# Patient Record
Sex: FEMALE | Race: WHITE | Employment: STUDENT | URBAN - METROPOLITAN AREA
[De-identification: names, ages, dates, MRNs, and addresses within clinical notes are randomized per-mention and may not be internally consistent; named-entity substitution may affect disease eponyms.]

---
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
## 2019-09-19 ENCOUNTER — TELEPHONE (OUTPATIENT)
Dept: PEDIATRICS | Facility: CLINIC | Age: 19
End: 2019-09-19

## 2019-09-19 NOTE — TELEPHONE ENCOUNTER
Called and spoke to Radha.  Discussed schedule a yearly follow up for weight loss surgery.  Radha did not think that she needed and appointment and reported that she was too busy.  Let Radha know that it was important that she have yearly check ups to check in and get blood work done.  Encouraged Radha to call back when she would like an appointment.  Radha had no other questions at this time.

## 2020-01-06 ENCOUNTER — OFFICE VISIT (OUTPATIENT)
Dept: GASTROENTEROLOGY | Facility: CLINIC | Age: 20
End: 2020-01-06
Payer: COMMERCIAL

## 2020-01-06 VITALS
DIASTOLIC BLOOD PRESSURE: 78 MMHG | SYSTOLIC BLOOD PRESSURE: 124 MMHG | OXYGEN SATURATION: 96 % | WEIGHT: 293 LBS | HEART RATE: 114 BPM | TEMPERATURE: 98.1 F | RESPIRATION RATE: 20 BRPM | BODY MASS INDEX: 49.78 KG/M2

## 2020-01-06 DIAGNOSIS — D50.9 IRON DEFICIENCY ANEMIA, UNSPECIFIED IRON DEFICIENCY ANEMIA TYPE: ICD-10-CM

## 2020-01-06 DIAGNOSIS — E66.01 SEVERE OBESITY (BMI >= 40) (H): Primary | ICD-10-CM

## 2020-01-06 LAB
ALBUMIN SERPL-MCNC: 3.2 G/DL (ref 3.4–5)
ALP SERPL-CCNC: 137 U/L (ref 40–150)
ALT SERPL W P-5'-P-CCNC: 20 U/L (ref 0–50)
ANION GAP SERPL CALCULATED.3IONS-SCNC: 4 MMOL/L (ref 3–14)
AST SERPL W P-5'-P-CCNC: 14 U/L (ref 0–45)
BILIRUB SERPL-MCNC: 0.3 MG/DL (ref 0.2–1.3)
BUN SERPL-MCNC: 9 MG/DL (ref 7–30)
CALCIUM SERPL-MCNC: 9.2 MG/DL (ref 8.5–10.1)
CHLORIDE SERPL-SCNC: 107 MMOL/L (ref 94–109)
CHOLEST SERPL-MCNC: 232 MG/DL
CO2 SERPL-SCNC: 28 MMOL/L (ref 20–32)
CREAT SERPL-MCNC: 0.67 MG/DL (ref 0.52–1.04)
ERYTHROCYTE [DISTWIDTH] IN BLOOD BY AUTOMATED COUNT: 15.6 % (ref 10–15)
FERRITIN SERPL-MCNC: 3 NG/ML (ref 12–150)
GFR SERPL CREATININE-BSD FRML MDRD: >90 ML/MIN/{1.73_M2}
GLUCOSE SERPL-MCNC: 97 MG/DL (ref 70–99)
HBA1C MFR BLD: 5.1 % (ref 0–5.6)
HCT VFR BLD AUTO: 33.7 % (ref 35–47)
HDLC SERPL-MCNC: 60 MG/DL
HGB BLD-MCNC: 10 G/DL (ref 11.7–15.7)
LDLC SERPL CALC-MCNC: 140 MG/DL
MCH RBC QN AUTO: 22.7 PG (ref 26.5–33)
MCHC RBC AUTO-ENTMCNC: 29.7 G/DL (ref 31.5–36.5)
MCV RBC AUTO: 76 FL (ref 78–100)
NONHDLC SERPL-MCNC: 172 MG/DL
PLATELET # BLD AUTO: 506 10E9/L (ref 150–450)
POTASSIUM SERPL-SCNC: 4 MMOL/L (ref 3.4–5.3)
PROT SERPL-MCNC: 7.3 G/DL (ref 6.8–8.8)
PTH-INTACT SERPL-MCNC: 69 PG/ML (ref 18–80)
RBC # BLD AUTO: 4.41 10E12/L (ref 3.8–5.2)
SODIUM SERPL-SCNC: 139 MMOL/L (ref 133–144)
TRIGL SERPL-MCNC: 159 MG/DL
VIT B12 SERPL-MCNC: 294 PG/ML (ref 193–986)
WBC # BLD AUTO: 9.8 10E9/L (ref 4–11)

## 2020-01-06 PROCEDURE — 82728 ASSAY OF FERRITIN: CPT | Performed by: PEDIATRICS

## 2020-01-06 PROCEDURE — 85027 COMPLETE CBC AUTOMATED: CPT | Performed by: PEDIATRICS

## 2020-01-06 PROCEDURE — 80053 COMPREHEN METABOLIC PANEL: CPT | Performed by: PEDIATRICS

## 2020-01-06 PROCEDURE — 83970 ASSAY OF PARATHORMONE: CPT | Performed by: PEDIATRICS

## 2020-01-06 PROCEDURE — 83036 HEMOGLOBIN GLYCOSYLATED A1C: CPT | Performed by: PEDIATRICS

## 2020-01-06 PROCEDURE — 82306 VITAMIN D 25 HYDROXY: CPT | Performed by: PEDIATRICS

## 2020-01-06 PROCEDURE — 82607 VITAMIN B-12: CPT | Performed by: PEDIATRICS

## 2020-01-06 PROCEDURE — 80061 LIPID PANEL: CPT | Performed by: PEDIATRICS

## 2020-01-06 PROCEDURE — 99214 OFFICE O/P EST MOD 30 MIN: CPT | Performed by: PEDIATRICS

## 2020-01-06 PROCEDURE — 36415 COLL VENOUS BLD VENIPUNCTURE: CPT | Performed by: PEDIATRICS

## 2020-01-06 RX ORDER — ETHYNODIOL DIACETATE AND ETHINYL ESTRADIOL 1 MG-35MCG
KIT ORAL
COMMUNITY
Start: 2019-10-02

## 2020-01-06 ASSESSMENT — PAIN SCALES - GENERAL: PAINLEVEL: NO PAIN (0)

## 2020-01-06 NOTE — Clinical Note
Juvenal Craig you call this kid to tell her about low hgb and lower ferritin (iron stores).  She should take vitron c bid.  rx is sent.  I will recheck her labs at next visit.  If levels don't improve substantially, may need iron infusion.  Thank you!c

## 2020-01-06 NOTE — PROGRESS NOTES
Date: 2020    PATIENT:  Bi Alves  :          2000  LYDIA:          2020    Dear Dr. Lesch, Anthony:    I had the pleasure of seeing your patient, Bi Alves, for a follow-up visit in the [facility] on 2020 at the [facility] in [place].  Bi was last seen in this clinic in 2017.  Please see below for my assessment and plan of care.    Intercurrent History:    Bi was accompanied to this appointment by herself.  As you may recall, Bi is a 19 year old young lady who is now 3 years s/p sleeve gastrectomy for severe complicated obesity (surgery was in Oct 2016).  Her last visit here was when she was 6 mos s/p surgery.  Her pre-surgery wt was 288 lbs and today she is 325 lbs.    She presents today primarily to get a signature on a form that will allow her insurance to pay for a gym membership.  Indeed she states that her primary goal is to increase her physical fitness.    She reports that since our last visit in , her weight got down to 218 pounds in 2018.  This was her bridgette.  However, in 2018 her dad had a serious stroke.  This incident led to weight regain with accompanying binge eating.    Her current eating practices are notable for having yogurt and apples for both breakfast and lunch.  For dinner she often eats a frozen meal (such as lean cuisine) or she prepares tacos or quiche for example.  She denies drinking liquid calories.  No alcohol use.  She denies current binge eating episodes.  She denies strong hunger.    Her review of systems is notable for good sleep without snoring.  Her periods are regular.  She denies dysphagia, abdominal pain, constipation.  She has diarrhea with certain foods, particularly those that are high in sugar.  She has GERD symptoms but these are well controlled with omeprazole.    She is currently living in an apartment with  "her dad.  Her dad completely recovered from the stroke.  Her mom is struggling with mental illness and has been in various residential treatment centers.  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  She is not working.    Radha has been meeting every 4 to 6 weeks with Vianey Rothman and mental health medication prescriber at [facility].  She reports that her mood has been up and down.       Current Medications:  Current Outpatient Rx   Medication Sig Dispense Refill     CRANBERRY EXTRACT PO Take 500 mg by mouth        ethynodiol-ethinyl estradiol (KELNOR 1/35) 1-35 MG-MCG tablet        HYDROXYZINE HCL PO Take 50 mg by mouth        omeprazole (PRILOSEC) 20 MG capsule   1     prazosin (MINIPRESS) 5 MG capsule Take 1 capsule (5 mg) by mouth At Bedtime       Venlafaxine HCl (EFFEXOR XR PO)        Levonorgestrel, [id number], (NORPLANT SYSTEM IL) 68 mg by IMPLANT route        melatonin 5 MG tablet Take 10 mg by mouth nightly as needed for sleep          Physical Exam:    Vitals:    B/P:   BP Readings from Last 1 Encounters:   01/06/20 124/78     BP:  Blood pressure percentiles are not available for patients who are 18 years or older.  P:   Pulse Readings from Last 1 Encounters:   01/06/20 114     R: @LASTBRATE(1)@    Measured Weights:  Wt Readings from Last 4 Encounters:   01/06/20 147.7 kg (325 lb 9.9 oz)   04/24/17 108 kg (238 lb) (>99 %)*   01/12/17 116.9 kg (257 lb 11.5 oz) (>99 %)*   11/10/16 122.1 kg (269 lb 2.9 oz) (>99 %)*     * Growth percentiles are based on CDC (Girls, 2-20 Years) data.     Height:    Ht Readings from Last 4 Encounters:   04/24/17 1.723 m (5' 7.82\") (92 %)*   01/12/17 1.74 m (5' 8.5\") (95 %)*   11/10/16 1.734 m (5' 8.27\") (95 %)*   10/19/16 1.74 m (5' 8.5\") (95 %)*     * Growth percentiles are based on CDC (Girls, 2-20 Years) data.       Body Mass Index:  Body mass index is 49.78 kg/m .  Body Mass Index Percentile:  Normalized BMI data available only for age 0 to 20 years.    Labs:    Results for " orders placed or performed in visit on 01/06/20   CBC with platelets [CMT216]     Status: Abnormal   Result Value Ref Range    WBC 9.8 4.0 - 11.0 10e9/L    RBC Count 4.41 3.8 - 5.2 10e12/L    Hemoglobin 10.0 (L) 11.7 - 15.7 g/dL    Hematocrit 33.7 (L) 35.0 - 47.0 %    MCV 76 (L) 78 - 100 fl    MCH 22.7 (L) 26.5 - 33.0 pg    MCHC 29.7 (L) 31.5 - 36.5 g/dL    RDW 15.6 (H) 10.0 - 15.0 %    Platelet Count 506 (H) 150 - 450 10e9/L   Comprehensive metabolic panel [LAB17]     Status: Abnormal   Result Value Ref Range    Sodium 139 133 - 144 mmol/L    Potassium 4.0 3.4 - 5.3 mmol/L    Chloride 107 94 - 109 mmol/L    Carbon Dioxide 28 20 - 32 mmol/L    Anion Gap 4 3 - 14 mmol/L    Glucose 97 70 - 99 mg/dL    Urea Nitrogen 9 7 - 30 mg/dL    Creatinine 0.67 0.52 - 1.04 mg/dL    GFR Estimate >90 >60 mL/min/[1.73_m2]    GFR Estimate If Black >90 >60 mL/min/[1.73_m2]    Calcium 9.2 8.5 - 10.1 mg/dL    Bilirubin Total 0.3 0.2 - 1.3 mg/dL    Albumin 3.2 (L) 3.4 - 5.0 g/dL    Protein Total 7.3 6.8 - 8.8 g/dL    Alkaline Phosphatase 137 40 - 150 U/L    ALT 20 0 - 50 U/L    AST 14 0 - 45 U/L   Parathyroid Hormone Intact [VMQ530]     Status: None   Result Value Ref Range    Parathyroid Hormone Intact 69 18 - 80 pg/mL   Vitamin D [AUD935]     Status: None   Result Value Ref Range    Vitamin D Deficiency screening 37 20 - 75 ug/L   Vitamin B12 [LAB67]     Status: None   Result Value Ref Range    Vitamin B12 294 193 - 986 pg/mL   Lipid Profile [LAB18]     Status: Abnormal   Result Value Ref Range    Cholesterol 232 (H) <200 mg/dL    Triglycerides 159 (H) <150 mg/dL    HDL Cholesterol 60 >49 mg/dL    LDL Cholesterol Calculated 140 (H) <100 mg/dL    Non HDL Cholesterol 172 (H) <130 mg/dL   Ferritin [LAB68]     Status: Abnormal   Result Value Ref Range    Ferritin 3 (L) 12 - 150 ng/mL   Hemoglobin A1c [LAB90]     Status: None   Result Value Ref Range    Hemoglobin A1C 5.1 0 - 5.6 %         Assessment:      Bi is a 19 year old female who  is 3 years status post laparoscopic sleeve gastrectomy for severe complicated obesity.  She reports achieving a bridgette weight of 218 pounds in June 2018.  However she has experienced significant weight re-gain since then, about 110 lbs and now weighs more than her presurgery weight and is at her highest weight ever.  She attributes this weight regain to family stressors.  She reports today that she is not interested in meeting with a dietitian or addressing her eating behaviors, nor is she interested in anti-obesity pharmacotherapy.  She is however motivated to increase her physical activity and wants me to sign a form that will allow her insurance to pay for a gym club membership.    She continues to meet with a mental health practitioner for both medication and therapy.  She also takes omeprazole for GERD and has good sx relief.   Her ROS is otherwise negative.  Her labs today are notable for iron deficiency anemia and dyslipidemia.  Her liver enzymes, vit D, B12, and diabetes screens are normal.  She is not taking any supplements.      I spent a total of 25 minutes face-to-face with Bi during today s office visit. Over 50% of this time was spent counseling the patient and/or coordinating care regarding obesity. See note for details.     Bi s current problem list reviewed today includes:    Encounter Diagnoses   Name Primary?     Severe obesity (BMI >= 40) (H) Yes     Iron deficiency anemia, unspecified iron deficiency anemia type         Care Plan:    Class 3 obesity:  Go to gym; form filled out for insurance.  Consider RD visit and anti obesity medication    Fe deficiency anemia:  Start  Vitron C 125/66 orally twice daily  Recheck hemoglobin and ferritin after two months of treatment    Mixed hyperlipidemia in context of FH of premature CVD and severe obesity:  Continue to monitor for now.    We are looking forward to seeing Bi for a follow-up visit in 12 weeks.    Thank you for including me in the  care of your patient.  Please do not hesitate to call with questions or concerns.    Sincerely,    Lenka Arce MD MPH  Diplomate, American Board of Obesity Medicine    Director, Pediatric Weight Management Clinic  Department of Pediatrics  [facility] [phone]  [facility] [phone]  [facility] [phone]  [facility] [place] [phone]            CC  Copy to patient  Perlita Sanchez KEVIN  [id number]  [id number]  [facility] [address]

## 2020-01-06 NOTE — NURSING NOTE
iB Alves's goals for this visit include:   Chief Complaint   Patient presents with     RECHECK     Follow up        She requests these members of her care team be copied on today's visit information: Yes    PCP: Lesch, Anthony    Referring Provider:  Anthony Lesch, PA-C  [facility]  [address] [place] [address]    /78 (BP Location: Left arm, Patient Position: Sitting, Cuff Size: Adult Large)   Pulse 114   Temp 98.1  F (36.7  C) (Oral)   Resp 20   Wt 67 kg (147 lb 11.2 oz)   SpO2 96%   BMI 22.58 kg/m      Do you need any medication refills at today's visit? Neyda De La Rosa CMA

## 2020-01-07 LAB — DEPRECATED CALCIDIOL+CALCIFEROL SERPL-MC: 37 UG/L (ref 20–75)

## 2020-01-13 ENCOUNTER — TELEPHONE (OUTPATIENT)
Dept: PEDIATRICS | Facility: CLINIC | Age: 20
End: 2020-01-13

## 2020-01-13 NOTE — TELEPHONE ENCOUNTER
Called and spoke to Radha.  Discussed lab results as indicated by Dr. Arce below:  Hi KAYA   Can you call this kid to tell her about low hgb and lower ferritin (iron stores).  She should take vitron c bid.  rx is sent.  I will recheck her labs at next visit.  If levels don't improve substantially, may need iron infusion.  Thank you!   c      Discussed importance of taking Vitron every day twice a day to help improve hgb and ferritin levels.  Discussed possible need of iron infusion if labs do not improve.  Lab will be rechecked at appointment in April.    Radah had no questions at this time.  Encouraged her to call back with any questions or concerns.

## 2020-03-02 ENCOUNTER — HEALTH MAINTENANCE LETTER (OUTPATIENT)
Age: 20
End: 2020-03-02

## 2020-04-05 NOTE — PROGRESS NOTES
"Bi Alves is a 20 year old female who is being evaluated via a billable video visit.      The patient has been notified of following:     \"This video visit will be conducted via a call between you and your physician/provider. We have found that certain health care needs can be provided without the need for an in-person physical exam.  This service lets us provide the care you need with a video conversation.  If a prescription is necessary we can send it directly to your pharmacy.  If lab work is needed we can place an order for that and you can then stop by our lab to have the test done at a later time.    If during the course of the call the physician/provider feels a video visit is not appropriate, you will not be charged for this service.\"     Patient has given verbal consent for Video visit? Yes    Patient would like the video invitation sent by: Text to cell phone: [phone]    Video Start Time: 3:300    Bi Alves complains of    Chief Complaint   Patient presents with     RECHECK     WM f/u      Radha doesn't have a scale at home. No recent weight.    I have reviewed and updated the patient's Past Medical History, Social History, Family History and Medication List.    ALLERGIES  Penicillins and Pineapple    Additional provider notes: see below      Video-Visit Details    Type of service:  Video Visit    Video End Time (time video stopped): 3:25    Originating Location (pt. Location): Home    Distant Location (provider location):  home    Mode of Communication:  Video Conference via [employer]      Lenka Arce MD, MD                Date: 2020    PATIENT:  Bi Alves  :          2000  LYDIA:          2020      Intercurrent History:    Bi is a 19 year old young lady who is now 3 years s/p sleeve gastrectomy for severe complicated obesity (surgery was in Oct 2016).  She was last seen in this clinic 3 months ago.  That visit was after a very long hiatus from " the weight management program.  3 months ago she expressed no interest in addressing her dietary patterns.  Today is the same.  She reports and generalizations that she is eating a lot of salads.  She denies binge eating.  She denies eating to cope with stress or anxiety.  She states that she eats in her room at times only because there is no dining room area.  She eats dinner on the couch.  She denies eating secretively.  Her physical activity is limited.  Although she had plans to join a gym, this has not happened because of the COVID pandemic.    Radha reports that her mood has been somewhat low.  However, she excitedly reported that she went on her first date.  She met someone on an online platform.  Since that date, they have been speaking daily however they have not been able to see each other because of the quarantine.    She no longer has a psychiatrist or therapist.  She knows she needs to get 1 in order to get her psychotropic medications refilled.  She denies having any suicidal ideation.    She continues to live with her parents.  Her mom has been home since December but spends most of her days in bed, struggling with depression.  Tania is attending online school.    She has been taking her iron supplement only once per day and only since Feb.  Energy is low.    Current Medications:  Current Outpatient Rx   Medication Sig Dispense Refill     CRANBERRY EXTRACT PO Take 500 mg by mouth        ethynodiol-ethinyl estradiol (KELNOR 1/35) 1-35 MG-MCG tablet        ferrous fumarate 65 mg, Tribe. FE,-Vitamin C 125 mg (VITRON-C)  MG TABS tablet Take 1 tablet by mouth 2 times daily 120 tablet 3     HYDROXYZINE HCL PO Take 50 mg by mouth        lamoTRIgine (LAMICTAL) 200 MG tablet Take 1 tablet (200 mg) by mouth daily       omeprazole (PRILOSEC) 20 MG capsule   1     prazosin (MINIPRESS) 5 MG capsule Take 1 capsule (5 mg) by mouth At Bedtime       Venlafaxine HCl (EFFEXOR XR PO) Take 200 mg by mouth daily     "    melatonin 5 MG tablet Take 10 mg by mouth nightly as needed for sleep          Physical Exam:    Vitals:    B/P:   BP Readings from Last 1 Encounters:   01/06/20 124/78     BP:  Blood pressure percentiles are not available for patients who are 18 years or older.  P:   Pulse Readings from Last 1 Encounters:   01/06/20 114     R: @LASTBRATE(1)@    Measured Weights: no home wt today bc no scale that works  Wt Readings from Last 4 Encounters:   01/06/20 147.7 kg (325 lb 9.9 oz)   04/24/17 108 kg (238 lb) (>99 %)*   01/12/17 116.9 kg (257 lb 11.5 oz) (>99 %)*   11/10/16 122.1 kg (269 lb 2.9 oz) (>99 %)*     * Growth percentiles are based on CDC (Girls, 2-20 Years) data.     Height:    Ht Readings from Last 4 Encounters:   04/24/17 1.723 m (5' 7.82\") (92 %)*   01/12/17 1.74 m (5' 8.5\") (95 %)*   11/10/16 1.734 m (5' 8.27\") (95 %)*   10/19/16 1.74 m (5' 8.5\") (95 %)*     * Growth percentiles are based on [facility] (Girls, 2-20 Years) data.       Body Mass Index:  There is no height or weight on file to calculate BMI.  Body Mass Index Percentile:  No height and weight on file for this encounter.    Labs:    No results found for any visits on 04/06/20.      Assessment:      Bi is a 19 year old female who is 3 years status post laparoscopic sleeve gastrectomy for severe complicated obesity.  She has regained all of the weight (approximately 100 pounds) that she lost since bariatric surgery.  She reports no significant changes since our last visit 3 months ago and continues to not be interested in pursuing specific weight management therapies such as dietary interventions or anti-obesity medications.  Although she is motivated to increase her physical activity, her plans of attending a gym have been derailed due to the Covid pandemic.  Her mood has been fair on her current psychotropic medication regimen though she is in need of a new psychiatrist because her current one moved on.  Finally, she has not been compliant with " her iron supplementation.    I spent a total of 25 minutes face-to-face with Bi during today s office visit. Over 50% of this time was spent counseling the patient and/or coordinating care regarding obesity. See note for details.     Bi s current problem list reviewed today includes:    Encounter Diagnosis   Name Primary?     Depression, unspecified depression type Yes        Care Plan:    Class 3 obesity:  Patient agreed to walking outside.  No specific goal was made.  Instructed to obtain scale for home use before our next visit.  Not interested in anti obesity medication or RD visit.    Fe deficiency anemia:  Patient agreed to taking Vitron C 125/66 orally twice daily  Recheck hemoglobin and ferritin after two months of treatment    Mixed hyperlipidemia in context of FH of premature CVD and severe obesity:  Continue to monitor for now.    Depression and anxiety:  Find new psychiatrist.    We are looking forward to seeing Bi for a follow-up visit in 12 weeks.    Thank you for including me in the care of your patient.  Please do not hesitate to call with questions or concerns.    Sincerely,    Lenka Arce MD MPH  Diplomate, American Board of Obesity Medicine    Director, Pediatric Weight Management Clinic  Department of Pediatrics  [facility] [phone]  [facility] [phone]  [facility] [phone]  [facility] [place] [phone]            CC  Copy to patient  Perlita SanchezCYNTHIAOTTO  [address]  [id number]  [facility] [address]

## 2020-04-06 ENCOUNTER — VIRTUAL VISIT (OUTPATIENT)
Dept: GASTROENTEROLOGY | Facility: CLINIC | Age: 20
End: 2020-04-06
Payer: COMMERCIAL

## 2020-04-06 DIAGNOSIS — F32.A DEPRESSION, UNSPECIFIED DEPRESSION TYPE: ICD-10-CM

## 2020-04-06 DIAGNOSIS — E66.01 SEVERE OBESITY (BMI >= 40) (H): ICD-10-CM

## 2020-04-06 DIAGNOSIS — D50.9 IRON DEFICIENCY ANEMIA, UNSPECIFIED IRON DEFICIENCY ANEMIA TYPE: ICD-10-CM

## 2020-04-06 PROCEDURE — 99214 OFFICE O/P EST MOD 30 MIN: CPT | Mod: GT | Performed by: PEDIATRICS

## 2020-04-06 RX ORDER — LAMOTRIGINE 200 MG/1
200 TABLET ORAL DAILY
COMMUNITY
Start: 2020-04-06

## 2020-04-08 ENCOUNTER — TELEPHONE (OUTPATIENT)
Dept: GASTROENTEROLOGY | Facility: CLINIC | Age: 20
End: 2020-04-08

## 2020-04-08 NOTE — TELEPHONE ENCOUNTER
1st  Attempt: M for Radha to call back to schedule her routine follow up appointment with Dr Arce.  Radha is due to be seen in July.  I asked Radha to call [phone] to schedule this appointment.       Macho Clemons  Procedure , [place]  [facility]

## 2020-04-22 NOTE — TELEPHONE ENCOUNTER
2nd Attempt: [facility] for Radha to call back to schedule her routine follow up appointment with Dr Arce.  Radha is due to be seen in July.  I asked Radha to call [phone] to schedule this appointment.      3rd Attempt Letter sent.     Macho Clemons  Procedure , [facility] Adult Endocrinology

## 2020-12-14 ENCOUNTER — HEALTH MAINTENANCE LETTER (OUTPATIENT)
Age: 20
End: 2020-12-14

## 2021-04-18 ENCOUNTER — HEALTH MAINTENANCE LETTER (OUTPATIENT)
Age: 21
End: 2021-04-18

## 2021-04-29 RX ORDER — NORETHINDRONE ACETATE AND ETHINYL ESTRADIOL 1; 5 MG/1; UG/1
1 TABLET ORAL DAILY
COMMUNITY
End: 2021-05-14

## 2021-04-29 RX ORDER — OMEPRAZOLE 20 MG/1
20 CAPSULE, DELAYED RELEASE ORAL DAILY
COMMUNITY

## 2021-04-29 RX ORDER — CLONIDINE HYDROCHLORIDE 0.2 MG/1
0.4 TABLET ORAL DAILY
COMMUNITY

## 2021-04-29 RX ORDER — VENLAFAXINE 37.5 MG/1
37.5 TABLET ORAL DAILY
COMMUNITY
End: 2021-05-14

## 2021-04-29 RX ORDER — HYDROXYZINE 50 MG/1
50 TABLET, FILM COATED ORAL DAILY
COMMUNITY

## 2021-04-29 RX ORDER — LAMOTRIGINE 200 MG/1
200 TABLET ORAL DAILY
COMMUNITY

## 2021-05-14 ENCOUNTER — OFFICE VISIT (OUTPATIENT)
Dept: BARIATRICS/WEIGHT MGMT | Facility: CLINIC | Age: 21
End: 2021-05-14

## 2021-05-14 VITALS
DIASTOLIC BLOOD PRESSURE: 70 MMHG | HEIGHT: 69 IN | TEMPERATURE: 97.1 F | BODY MASS INDEX: 43.4 KG/M2 | WEIGHT: 293 LBS | RESPIRATION RATE: 18 BRPM | OXYGEN SATURATION: 98 % | SYSTOLIC BLOOD PRESSURE: 124 MMHG | HEART RATE: 124 BPM

## 2021-05-14 DIAGNOSIS — E66.01 OBESITY, CLASS III, BMI 40-49.9 (MORBID OBESITY) (HCC): Primary | ICD-10-CM

## 2021-05-14 DIAGNOSIS — R10.13 DYSPEPSIA: ICD-10-CM

## 2021-05-14 DIAGNOSIS — K21.9 GASTROESOPHAGEAL REFLUX DISEASE, UNSPECIFIED WHETHER ESOPHAGITIS PRESENT: ICD-10-CM

## 2021-05-14 PROCEDURE — 99204 OFFICE O/P NEW MOD 45 MIN: CPT | Performed by: PHYSICIAN ASSISTANT

## 2021-05-14 RX ORDER — VENLAFAXINE HYDROCHLORIDE 75 MG/1
CAPSULE, EXTENDED RELEASE ORAL
COMMUNITY

## 2021-05-14 RX ORDER — QUETIAPINE FUMARATE 25 MG/1
25 TABLET, FILM COATED ORAL DAILY
COMMUNITY
Start: 2021-05-05 | End: 2021-05-14

## 2021-05-14 RX ORDER — ETHYNODIOL DIACETATE AND ETHINYL ESTRADIOL 1 MG-35MCG
1 KIT ORAL DAILY
COMMUNITY
Start: 2021-04-12

## 2021-05-14 RX ORDER — QUETIAPINE FUMARATE 25 MG/1
TABLET, FILM COATED ORAL
COMMUNITY

## 2021-05-14 RX ORDER — VENLAFAXINE HYDROCHLORIDE 150 MG/1
CAPSULE, EXTENDED RELEASE ORAL
COMMUNITY

## 2021-05-14 NOTE — PROGRESS NOTES
"[facility] BARIATRIC SURGERY  [address]  [address]  [facility] [address] [phone]      Patient  Name:  Nas Baird  :  2000      Date of Visit: 2021      Chief Complaint:  weight gain; unable to maintain weight loss      History of Present Illness:  Nas Baird is a 21 y.o. female who presents today for evaluation, education and consultation regarding revision metabolic and bariatric surgery with Dr. Resendiz.    s/p LSG/HHR 10/2016 w/ Dr. Petty in [place] ([address]).     Highest lifetime weight:  340 lbs.  Presurgery weight: 282 lbs w/ BMI 42.  Lowest weight: 215 lbs.  Current weight: 333 lbs.      Hx GERD, unchanged s/p LSG/HHR, controlled w/ daily PPI.  Denies dysphagia/N/V/AP.   Reports hx IBS - chronic loose stools + cramping, ? food sensitivity.  Denies prior GB eval.  No recent UGI imaging or endoscopic evals.  No recent bariatric care.      Feels like she still has portion control.  Eats \"healthy\" - salads, yogurt, eggs.  Drinks water.  Exercise varies, but sometimes limited by her depression.      Complete history has been obtained and discussed today, as pertinent to metabolic/ bariatric surgery.     Addendum:  UGI 21 @[facility] - unremarkable, s/p VSG.     Past Medical History:   Diagnosis Date   • Anxiety and depression    • Dyspepsia    • Dyspnea on exertion    • Fatigue    • GERD (gastroesophageal reflux disease)     controlled on Omeprazole 20mg daily (x5 yrs)   • Hypertriglyceridemia    • IBS (irritable bowel syndrome)     chronic loose stools + cramping, ? food sensitivity   • Insomnia    • Morbid obesity (CMS/HCC)    • Postoperative nausea    • Sleep apnea     patient denies, although [place] records report h/o     Past Surgical History:   Procedure Laterality Date   • GASTRIC SLEEVE LAPAROSCOPIC      s/p LSG/HHR 10/2016 w/ Dr. Petty in [place] ([address])   • TONSILLECTOMY     • WISDOM TOOTH EXTRACTION         Allergies   Allergen Reactions   • " Adhesive Tape Rash   • Penicillins Hives     Uncertain if can tolerate Keflex.         Current Outpatient Medications:   •  cloNIDine (CATAPRES) 0.2 MG tablet, Take 0.4 mg by mouth Daily., Disp: , Rfl:   •  ethynodiol-ethinyl estradiol (KELNOR,ZOVIA) 1-35 MG-MCG per tablet, Take 1 tablet by mouth Daily., Disp: , Rfl:   •  hydrOXYzine (ATARAX) 50 MG tablet, Take 50 mg by mouth Daily., Disp: , Rfl:   •  lamoTRIgine (LaMICtal) 200 MG tablet, Take 200 mg by mouth Daily., Disp: , Rfl:   •  omeprazole (priLOSEC) 20 MG capsule, Take 20 mg by mouth Daily., Disp: , Rfl:   •  QUEtiapine (SEROquel) 25 MG tablet, , Disp: , Rfl:   •  venlafaxine XR (Effexor XR) 150 MG 24 hr capsule, , Disp: , Rfl:   •  venlafaxine XR (Effexor XR) 75 MG 24 hr capsule, , Disp: , Rfl:     Social History     Socioeconomic History   • Marital status: Single     Spouse name: Not on file   • Number of children: Not on file   • Years of education: Not on file   • Highest education level: Not on file   Tobacco Use   • Smoking status: Never Smoker   • Smokeless tobacco: Never Used   Substance and Sexual Activity   • Alcohol use: Never   • Drug use: Never     Social History     Social History Narrative    Lives in [place], [place] w/ her parents.  [employer] - not yet started working.         Family History   Problem Relation Age of Onset   • Diabetes Mother    • Stroke Father    • No Known Problems Brother    • Skin cancer Maternal Grandmother    • Diabetes Maternal Grandmother    • Diabetes Maternal Grandfather    • No Known Problems Paternal Grandmother    • Hypertension Paternal Grandfather    • Heart disease Paternal Grandfather    • Heart attack Paternal Grandfather    • Sleep apnea Paternal Grandfather        Review of Systems:  Constitutional:  reports fatigue, weight gain and denies fevers, chills.  HEENT:  denies headache, ear pain or loss of hearing, blurred or double vision, nasal discharge or sore throat.  Cardiovascular:   denies HTN, hx heart disease,  chest pain, palpitations, hx DVT.  Respiratory:   denies cough , wheezing, sleep apnea, asthma, hx PE.  Gastrointestinal:  reports heartburn, IBS and denies dysphagia, nausea, vomiting, abdominal pain, gallbladder issues, liver disease.  Genitourinary:   denies incontinence, hematuria, dysuria, polyuria, polydipsia, renal insufficiency.    Musculoskeletal:   denies fibromyalgia, arthritis and autoimmune disease.  Neurological:   denies numbness /tingling, dizziness, confusion, seizure.  Psychiatric:  reports hx depression, hx anxiety and denies bipolar disorder.  Endocrine:  denies glucose intolerance, diabetes, thyroid disease.  Hematologic:  denies bruising, bleeding disorder, hx anemia, hx blood transfusion.  Skin:   denies rashes, hx MRSA.    Physical Exam:  Vital Signs:  Weight: (!) 151 kg (333 lb 8 oz)   Body mass index is 49.97 kg/m².  Temp: 97.1 °F (36.2 °C)   Heart Rate: (!) 124   BP: 124/70     Physical Exam  Vitals reviewed.   Constitutional:       Appearance: She is well-developed.      Comments: wearing a mask   HENT:      Head: Normocephalic and atraumatic.   Eyes:      General: No scleral icterus.     Conjunctiva/sclera: Conjunctivae normal.   Neck:      Thyroid: No thyromegaly.   Cardiovascular:      Rate and Rhythm: Normal rate and regular rhythm.      Heart sounds: No murmur heard.     Pulmonary:      Effort: Pulmonary effort is normal. No respiratory distress.      Breath sounds: Normal breath sounds. No wheezing or rales.   Abdominal:      General: Bowel sounds are normal. There is no distension.      Palpations: Abdomen is soft. There is no mass.      Tenderness: There is no abdominal tenderness.      Hernia: No hernia is present.      Comments: scars: LSG   Musculoskeletal:         General: Normal range of motion.      Cervical back: Neck supple.   Skin:     General: Skin is warm and dry.      Findings: No rash.   Neurological:      Mental Status: She is alert and oriented to person, place, and  time.      Gait: Gait normal.   Psychiatric:         Judgment: Judgment normal.         Patient Active Problem List   Diagnosis   • Anxiety and depression   • Morbid obesity (CMS/HCC)   • GERD (gastroesophageal reflux disease)   • Sleep apnea   • Hypertriglyceridemia   • Insomnia   • PONV (postoperative nausea and vomiting)   • Fatigue   • Dyspepsia   • Dyspnea on exertion   • IBS (irritable bowel syndrome)       Assessment:  21 y.o. female with medically complicated obesity pursuing Revision.    Metabolic & Bariatric Surgery is deemed medically necessary given the following: Patient's Body mass index is 49.97 kg/m². indicating that she is obese (BMI >30). Obesity-related health conditions include the following: GERD and anxiety/depression. Obesity is worsening. BMI is is above average; BMI management plan is completed. We discussed consulting a Bariatric surgeon and pursuing revision eval.       Plan:  Current revision options unknown.  Will schedule UGI + EGD w/ Dr. Resendiz for further evaluation.      If deemed a surgical candidate, additional eval will include: CBC, CMP, Lipids, TSH, HgA1C, H.Pylori, EKG, CXR and Gallbladder Eval.    Patient understands that bariatric surgery is not cosmetic surgery but rather a tool to help make a lifelong commitment to lifestyle changes including diet, exercise and behavior modifications.  The patient has been educated today on those expected postoperative lifestyle changes.  Psychological and Nutritional consultations will be completed prior to surgery.  All questions/concerns have been addressed.      Further input to follow pending the above.      Addendum:  UGI 6/2/21 @[facility] - unremarkable, s/p VSG.   Will proceed w/ EGD for further eval.          REMI Johns

## 2021-05-30 ENCOUNTER — APPOINTMENT (OUTPATIENT)
Dept: PREADMISSION TESTING | Facility: HOSPITAL | Age: 21
End: 2021-05-30

## 2021-05-30 PROCEDURE — U0004 COV-19 TEST NON-CDC HGH THRU: HCPCS

## 2021-05-30 PROCEDURE — C9803 HOPD COVID-19 SPEC COLLECT: HCPCS

## 2021-05-31 LAB — SARS-COV-2 RNA NOSE QL NAA+PROBE: NOT DETECTED

## 2021-06-02 ENCOUNTER — HOSPITAL ENCOUNTER (OUTPATIENT)
Dept: GENERAL RADIOLOGY | Facility: HOSPITAL | Age: 21
Discharge: HOME | End: 2021-06-02
Admitting: PHYSICIAN ASSISTANT

## 2021-06-02 DIAGNOSIS — K21.9 GASTROESOPHAGEAL REFLUX DISEASE, UNSPECIFIED WHETHER ESOPHAGITIS PRESENT: ICD-10-CM

## 2021-06-02 DIAGNOSIS — R10.13 DYSPEPSIA: ICD-10-CM

## 2021-06-02 PROCEDURE — 74240 X-RAY XM UPR GI TRC 1CNTRST: CPT

## 2021-06-02 RX ADMIN — BARIUM SULFATE 183 ML: 960 POWDER, FOR SUSPENSION ORAL at 10:14

## 2021-06-28 ENCOUNTER — OFFICE VISIT (OUTPATIENT)
Dept: BARIATRICS/WEIGHT MGMT | Facility: CLINIC | Age: 21
End: 2021-06-28

## 2021-06-28 VITALS
OXYGEN SATURATION: 98 % | RESPIRATION RATE: 18 BRPM | HEIGHT: 69 IN | SYSTOLIC BLOOD PRESSURE: 126 MMHG | HEART RATE: 109 BPM | WEIGHT: 293 LBS | BODY MASS INDEX: 43.4 KG/M2 | TEMPERATURE: 97.3 F | DIASTOLIC BLOOD PRESSURE: 72 MMHG

## 2021-06-28 DIAGNOSIS — K21.9 GASTROESOPHAGEAL REFLUX DISEASE, UNSPECIFIED WHETHER ESOPHAGITIS PRESENT: ICD-10-CM

## 2021-06-28 DIAGNOSIS — R10.9 ABDOMINAL PAIN, UNSPECIFIED ABDOMINAL LOCATION: ICD-10-CM

## 2021-06-28 DIAGNOSIS — Z98.84 STATUS POST BARIATRIC SURGERY: Primary | ICD-10-CM

## 2021-06-28 PROCEDURE — 99214 OFFICE O/P EST MOD 30 MIN: CPT | Performed by: PHYSICIAN ASSISTANT

## 2021-06-28 NOTE — PROGRESS NOTES
"[facility] Bariatric Surgery  [address]  [address]  [facility] [address] [phone]        Patient Name:  Nas Baird.  :  2000        Reason for Visit:   Weight gain, unable to maintain weightloss, evaluate for possible metabolic and bariatric surgery      HPI: Nas Baird is a 21 y.o. female s/p LSG/HHR 10/2016 w/ Dr. Petty in [place] ([address]) who presents for evaluation of reflux in preparation for bariatric and metabolic surgery, specifically revision with Dr. Resendiz.     Initial intake with Berna Marte PA-C 21- \"Highest lifetime weight:  340 lbs.  Presurgery weight: 282 lbs w/ BMI 42.  Lowest weight: 215 lbs.  Current weight: 333 lbs.                  Hx GERD, unchanged s/p LSG/HHR, controlled w/ daily PPI.  Denies dysphagia/N/V/AP.   Reports hx IBS - chronic loose stools + cramping, ? food sensitivity.  Denies prior GB eval.  No recent UGI imaging or endoscopic evals.  No recent bariatric care.       Feels like she still has portion control.  Eats \"healthy\" - salads, yogurt, eggs.  Drinks water.  Exercise varies, but sometimes limited by her depression. \"     Recent imaging:    UGI at [facility] 21 IMPRESSION:  Status post vertical sleeve gastrectomy x5 years. There was  no evidence of extraluminal contrast. No postoperative strictures were  seen.          Doing well.  No changes in medical history since last office visit. Continues with chronic \"digestive issues\", urgent BM post prandial, some cramping. Reflux is controlled on daily PPI. No other issues/concerns today. Denies dysphagia, reflux, nausea, vomiting, abdominal pain, pulmonary issues and fevers.  Denies prior history of h pylori. Had an EGD in middle school, non since. Again, GB present without prior eval, planned GBUS is proceeding with revision surgery.         Past Medical History:   Diagnosis Date   • Anxiety and depression    • Dyspepsia    • Dyspnea on exertion    • Fatigue    • GERD " "(gastroesophageal reflux disease)     controlled on Omeprazole 20mg daily (x5 yrs)   • Hypertriglyceridemia    • IBS (irritable bowel syndrome)     chronic loose stools + cramping, ? food sensitivity   • Insomnia    • Morbid obesity (CMS/HCC)    • Postoperative nausea    • Sleep apnea     patient denies, although [place] records report h/o     Past Surgical History:   Procedure Laterality Date   • GASTRIC SLEEVE LAPAROSCOPIC  2016    s/p LSG/HHR 10/2016 w/ Dr. Petty in [place] ([address])   • TONSILLECTOMY  2003   • WISDOM TOOTH EXTRACTION  2017     Outpatient Medications Marked as Taking for the 6/28/21 encounter (Office Visit) with Evelyn Franco PA-C   Medication Sig Dispense Refill   • cloNIDine (CATAPRES) 0.2 MG tablet Take 0.4 mg by mouth Daily.     • ethynodiol-ethinyl estradiol (KELNOR,ZOVIA) 1-35 MG-MCG per tablet Take 1 tablet by mouth Daily.     • hydrOXYzine (ATARAX) 50 MG tablet Take 50 mg by mouth Daily.     • lamoTRIgine (LaMICtal) 200 MG tablet Take 200 mg by mouth Daily.     • omeprazole (priLOSEC) 20 MG capsule Take 20 mg by mouth Daily.     • QUEtiapine (SEROquel) 25 MG tablet      • venlafaxine XR (Effexor XR) 150 MG 24 hr capsule      • venlafaxine XR (Effexor XR) 75 MG 24 hr capsule          Allergies   Allergen Reactions   • Adhesive Tape Rash   • Penicillins Hives     Uncertain if can tolerate Keflex.         Social History     Socioeconomic History   • Marital status: Single     Spouse name: Not on file   • Number of children: Not on file   • Years of education: Not on file   • Highest education level: Not on file   Tobacco Use   • Smoking status: Never Smoker   • Smokeless tobacco: Never Used   Substance and Sexual Activity   • Alcohol use: Never   • Drug use: Never       /72 (BP Location: Left arm, Patient Position: Sitting, Cuff Size: Adult)   Pulse 109   Temp 97.3 °F (36.3 °C) (Temporal)   Resp 18   Ht 174 cm (68.5\")   Wt (!) 149 kg (329 lb)   SpO2 98%   BMI 49.30 kg/m² "     Physical Exam  Constitutional:       Appearance: She is well-developed. She is obese.   HENT:      Head: Normocephalic and atraumatic.      Comments: mask  Cardiovascular:      Rate and Rhythm: Tachycardia present.   Pulmonary:      Effort: Pulmonary effort is normal.   Abdominal:      General: There is no distension.      Palpations: Abdomen is soft. There is no mass.      Tenderness: There is no abdominal tenderness.      Comments: Lap scars   Neurological:      Mental Status: She is alert.   Psychiatric:         Mood and Affect: Mood normal.         Behavior: Behavior normal.         Thought Content: Thought content normal.         Judgment: Judgment normal.           Assessment:  s/p LSG/HHR 10/2016 w/ Dr. Petty in [place] (34 Fr [facility])     ICD-10-CM ICD-9-CM   1. Status post bariatric surgery  Z98.84 V45.86   2. Gastroesophageal reflux disease, unspecified whether esophagitis present  K21.9 530.81   3. Abdominal pain, unspecified abdominal location  R10.9 789.00         Plan:  Will proceed with EGD for further evaluation. The risks and benefits of the upper endoscopy were discussed with the patient in detail and all questions were answered.  Possibility of perforation, bleeding, aspiration, and anesthesia reaction were reviewed.  Patient agrees to proceed.    Patient's Body mass index is 49.3 kg/m². indicating that she is morbidly obese (BMI > 40 or > 35 with obesity - related health condition). Obesity-related health conditions include the following: as above. Obesity is worsening. BMI is is above average; BMI management plan is completed. We discussed consulting a Bariatric surgeon..

## 2021-07-16 ENCOUNTER — APPOINTMENT (OUTPATIENT)
Dept: PREADMISSION TESTING | Facility: HOSPITAL | Age: 21
End: 2021-07-16

## 2021-07-16 LAB — SARS-COV-2 RNA PNL SPEC NAA+PROBE: NOT DETECTED

## 2021-07-16 PROCEDURE — U0004 COV-19 TEST NON-CDC HGH THRU: HCPCS

## 2021-07-16 PROCEDURE — C9803 HOPD COVID-19 SPEC COLLECT: HCPCS

## 2021-08-09 ENCOUNTER — TELEMEDICINE (OUTPATIENT)
Dept: BARIATRICS/WEIGHT MGMT | Facility: CLINIC | Age: 21
End: 2021-08-09

## 2021-08-09 DIAGNOSIS — K21.9 GASTROESOPHAGEAL REFLUX DISEASE, UNSPECIFIED WHETHER ESOPHAGITIS PRESENT: Primary | ICD-10-CM

## 2021-08-09 PROCEDURE — 99214 OFFICE O/P EST MOD 30 MIN: CPT | Performed by: PHYSICIAN ASSISTANT

## 2021-08-09 NOTE — PROGRESS NOTES
"[facility] Bariatric Surgery  [address]  [address]  [facility] [id number] [phone]        Patient Name:  Nas Baird.  :  2000        Reason for Visit:   Weight gain, unable to maintain weightloss, evaluate for possible metabolic and bariatric surgery      HPI: Nas Baird is a 21 y.o. female s/p LSG/HHR 10/2016 w/ Dr. Petty in [place] ([address]) who presents for evaluation of reflux in preparation for bariatric and metabolic surgery, specifically revision with Dr. Resendiz.     This was an audio and video enabled telemedicine encounter due to covid-19 pandemic, patient consents.       Initial intake with Berna Marte PA-C 21- \"Highest lifetime weight:  340 lbs.  Presurgery weight: 282 lbs w/ BMI 42.  Lowest weight: 215 lbs.  Current weight: 333 lbs.                  Hx GERD, unchanged s/p LSG/HHR, controlled w/ daily PPI.  Denies dysphagia/N/V/AP.   Reports hx IBS - chronic loose stools + cramping, ? food sensitivity.  Denies prior GB eval.  No recent UGI imaging or endoscopic evals.  No recent bariatric care.       Feels like she still has portion control.  Eats \"healthy\" - salads, yogurt, eggs.  Drinks water.  Exercise varies, but sometimes limited by her depression. \"      Recent imaging:     UGI at [facility] 21 IMPRESSION:  Status post vertical sleeve gastrectomy x5 years. There was  no evidence of extraluminal contrast. No postoperative strictures were  seen.        Today, doing well.  No changes in medical history since last office visit. Continues with chronic \"digestive issues\",urgen BM post prandial, some carmping. Reflux continues to be controlled on daily omeprazole 20mg. No other issues/concerns today. Denies dysphagia, reflux, nausea, vomiting, abdominal pain, pulmonary issues and fevers. Denies prior testing of h pylro. Had remote EGD in middle school, non since. Again, GB present with planned GBUS if proceeding with revision surgery.       Past Medical " History:   Diagnosis Date   • Anxiety and depression    • Dyspepsia    • Dyspnea on exertion    • Fatigue    • GERD (gastroesophageal reflux disease)     controlled on Omeprazole 20mg daily (x5 yrs)   • Hypertriglyceridemia    • IBS (irritable bowel syndrome)     chronic loose stools + cramping, ? food sensitivity   • Insomnia    • Morbid obesity (CMS/HCC)    • Postoperative nausea    • Sleep apnea     patient denies, although [place] records report h/o     Past Surgical History:   Procedure Laterality Date   • GASTRIC SLEEVE LAPAROSCOPIC  2016    s/p LSG/HHR 10/2016 w/ Dr. Petty in [place] ([address])   • TONSILLECTOMY  2003   • WISDOM TOOTH EXTRACTION  2017     Outpatient Medications Marked as Taking for the 8/9/21 encounter (Telemedicine) with Evelyn Franco PA-C   Medication Sig Dispense Refill   • cloNIDine (CATAPRES) 0.2 MG tablet Take 0.4 mg by mouth Daily.     • ethynodiol-ethinyl estradiol (KELNOR,ZOVIA) 1-35 MG-MCG per tablet Take 1 tablet by mouth Daily.     • hydrOXYzine (ATARAX) 50 MG tablet Take 50 mg by mouth Daily.     • lamoTRIgine (LaMICtal) 200 MG tablet Take 200 mg by mouth Daily.     • omeprazole (priLOSEC) 20 MG capsule Take 20 mg by mouth Daily.     • QUEtiapine (SEROquel) 25 MG tablet      • venlafaxine XR (Effexor XR) 150 MG 24 hr capsule      • venlafaxine XR (Effexor XR) 75 MG 24 hr capsule          Allergies   Allergen Reactions   • Adhesive Tape Rash   • Penicillins Hives     Uncertain if can tolerate Keflex.         Social History     Socioeconomic History   • Marital status: Single     Spouse name: Not on file   • Number of children: Not on file   • Years of education: Not on file   • Highest education level: Not on file   Tobacco Use   • Smoking status: Never Smoker   • Smokeless tobacco: Never Used   Substance and Sexual Activity   • Alcohol use: Never   • Drug use: Never       There were no vitals taken for this visit.    Physical Exam  Constitutional:       Appearance: She is  well-developed.   HENT:      Head: Normocephalic and atraumatic.   Pulmonary:      Effort: Pulmonary effort is normal.   Neurological:      Mental Status: She is alert and oriented to person, place, and time.   Psychiatric:         Thought Content: Thought content normal.       Last BMI 49    Assessment:  s/p LSG/HHR 10/2016 w/ Dr. Petty in [place] ([address]) who presents for evaluation of reflux in preparation for bariatric and metabolic surgery, specifically revision with Dr. Resendiz.     ICD-10-CM ICD-9-CM   1. Gastroesophageal reflux disease, unspecified whether esophagitis present  K21.9 530.81         Plan:  Will proceed with EGD for further evaluation. The risks and benefits of the upper endoscopy were discussed with the patient in detail and all questions were answered.  Possibility of perforation, bleeding, aspiration, and anesthesia reaction were reviewed.  Patient agrees to proceed.    Patient's There is no height or weight on file to calculate BMI. indicating that she is morbidly obese (BMI > 40 or > 35 with obesity - related health condition). Obesity-related health conditions include the following: as above. Obesity is worsening. BMI is is above average; BMI management plan is completed. We discussed consulting a Bariatric surgeon..

## 2021-08-13 ENCOUNTER — APPOINTMENT (OUTPATIENT)
Dept: PREADMISSION TESTING | Facility: HOSPITAL | Age: 21
End: 2021-08-13

## 2021-08-13 LAB — SARS-COV-2 RNA PNL SPEC NAA+PROBE: NOT DETECTED

## 2021-08-13 PROCEDURE — C9803 HOPD COVID-19 SPEC COLLECT: HCPCS

## 2021-08-13 PROCEDURE — U0004 COV-19 TEST NON-CDC HGH THRU: HCPCS

## 2021-08-16 ENCOUNTER — LAB REQUISITION (OUTPATIENT)
Dept: LAB | Facility: HOSPITAL | Age: 21
End: 2021-08-16

## 2021-08-16 ENCOUNTER — OUTSIDE FACILITY SERVICE (OUTPATIENT)
Dept: BARIATRICS/WEIGHT MGMT | Facility: CLINIC | Age: 21
End: 2021-08-16

## 2021-08-16 DIAGNOSIS — R12 HEARTBURN: ICD-10-CM

## 2021-08-16 PROCEDURE — 43239 EGD BIOPSY SINGLE/MULTIPLE: CPT | Performed by: SURGERY

## 2021-08-16 PROCEDURE — 88342 IMHCHEM/IMCYTCHM 1ST ANTB: CPT | Performed by: SURGERY

## 2021-08-16 PROCEDURE — 88305 TISSUE EXAM BY PATHOLOGIST: CPT | Performed by: SURGERY

## 2021-08-17 ENCOUNTER — TELEPHONE (OUTPATIENT)
Dept: BARIATRICS/WEIGHT MGMT | Facility: CLINIC | Age: 21
End: 2021-08-17

## 2021-08-17 DIAGNOSIS — R10.9 ABDOMINAL PAIN, UNSPECIFIED ABDOMINAL LOCATION: Primary | ICD-10-CM

## 2021-08-17 NOTE — TELEPHONE ENCOUNTER
----- Message from Fransico Resendiz MD sent at 8/16/2021 10:52 AM EDT -----  Also she needs to be back on her CPAP.  Once she is back on CPAP and has her gallbladder work-up completed have her come in to the office to see me to discuss options.  Thanks!    Fransico Resendiz MD  Joan, Evelyn MONTERO PA-C  Please do a gallbladder work-up, thanks!

## 2021-08-17 NOTE — TELEPHONE ENCOUNTER
Please let pt know Dr. Resendiz would like to evaluate her gallbladder given her digestive issues. I'll place order for US, if no stones, will obtain HIDA for further eval of GB function. He also would like her to get back on her CPAP- she can discuss this with PCP if needed.  After these things are completed, will have her schedule with Dr. Resendiz to discuss further options.      Patient notified and verbalized understanding.

## 2021-08-18 DIAGNOSIS — K21.9 GASTROESOPHAGEAL REFLUX DISEASE, UNSPECIFIED WHETHER ESOPHAGITIS PRESENT: ICD-10-CM

## 2021-08-18 LAB
CYTO UR: NORMAL
LAB AP CASE REPORT: NORMAL
LAB AP CLINICAL INFORMATION: NORMAL
PATH REPORT.FINAL DX SPEC: NORMAL
PATH REPORT.GROSS SPEC: NORMAL

## 2021-08-30 ENCOUNTER — HOSPITAL ENCOUNTER (OUTPATIENT)
Dept: ULTRASOUND IMAGING | Facility: HOSPITAL | Age: 21
Discharge: HOME | End: 2021-08-30
Admitting: PHYSICIAN ASSISTANT

## 2021-08-30 DIAGNOSIS — R10.9 ABDOMINAL PAIN, UNSPECIFIED ABDOMINAL LOCATION: ICD-10-CM

## 2021-08-30 PROCEDURE — 76705 ECHO EXAM OF ABDOMEN: CPT

## 2021-09-01 DIAGNOSIS — R10.13 DYSPEPSIA: Primary | ICD-10-CM

## 2021-10-02 ENCOUNTER — HEALTH MAINTENANCE LETTER (OUTPATIENT)
Age: 21
End: 2021-10-02

## 2021-11-19 ENCOUNTER — HOSPITAL ENCOUNTER (OUTPATIENT)
Dept: NUCLEAR MEDICINE | Facility: HOSPITAL | Age: 21
Discharge: HOME | End: 2021-11-19

## 2021-11-19 DIAGNOSIS — R10.13 DYSPEPSIA: ICD-10-CM

## 2021-11-19 PROCEDURE — 0 TECHNETIUM TC 99M MEBROFENIN KIT: Performed by: PHYSICIAN ASSISTANT

## 2021-11-19 PROCEDURE — 78226 HEPATOBILIARY SYSTEM IMAGING: CPT

## 2021-11-19 PROCEDURE — A9537 TC99M MEBROFENIN: HCPCS | Performed by: PHYSICIAN ASSISTANT

## 2021-11-19 RX ORDER — KIT FOR THE PREPARATION OF TECHNETIUM TC 99M MEBROFENIN 45 MG/10ML
1 INJECTION, POWDER, LYOPHILIZED, FOR SOLUTION INTRAVENOUS
Status: COMPLETED | OUTPATIENT
Start: 2021-11-19 | End: 2021-11-19

## 2021-11-19 RX ADMIN — MEBROFENIN 1 DOSE: 45 INJECTION, POWDER, LYOPHILIZED, FOR SOLUTION INTRAVENOUS at 13:15

## 2022-04-26 ENCOUNTER — CONSULT (OUTPATIENT)
Dept: BARIATRICS/WEIGHT MGMT | Facility: CLINIC | Age: 22
End: 2022-04-26

## 2022-04-26 VITALS
HEIGHT: 69 IN | OXYGEN SATURATION: 98 % | WEIGHT: 293 LBS | DIASTOLIC BLOOD PRESSURE: 82 MMHG | SYSTOLIC BLOOD PRESSURE: 120 MMHG | BODY MASS INDEX: 43.4 KG/M2 | HEART RATE: 120 BPM | TEMPERATURE: 96.9 F

## 2022-04-26 DIAGNOSIS — Z98.84 S/P LAPAROSCOPIC SLEEVE GASTRECTOMY: Primary | ICD-10-CM

## 2022-04-26 DIAGNOSIS — K21.9 GASTROESOPHAGEAL REFLUX DISEASE, UNSPECIFIED WHETHER ESOPHAGITIS PRESENT: ICD-10-CM

## 2022-04-26 DIAGNOSIS — F51.01 PRIMARY INSOMNIA: ICD-10-CM

## 2022-04-26 DIAGNOSIS — F32.A ANXIETY AND DEPRESSION: ICD-10-CM

## 2022-04-26 DIAGNOSIS — E78.1 HYPERTRIGLYCERIDEMIA: ICD-10-CM

## 2022-04-26 DIAGNOSIS — F41.9 ANXIETY AND DEPRESSION: ICD-10-CM

## 2022-04-26 DIAGNOSIS — G47.30 SLEEP APNEA, UNSPECIFIED TYPE: ICD-10-CM

## 2022-04-26 DIAGNOSIS — K58.0 IRRITABLE BOWEL SYNDROME WITH DIARRHEA: ICD-10-CM

## 2022-04-26 PROCEDURE — 99214 OFFICE O/P EST MOD 30 MIN: CPT | Performed by: SURGERY

## 2022-05-13 ENCOUNTER — TELEPHONE (OUTPATIENT)
Dept: BARIATRICS/WEIGHT MGMT | Facility: CLINIC | Age: 22
End: 2022-05-13

## 2022-05-13 NOTE — TELEPHONE ENCOUNTER
Called pt re nutrition consult prior to a revision of bariatric surgery. Will call pt back Thursday May 19 at 2p to complete this

## 2022-05-14 ENCOUNTER — HEALTH MAINTENANCE LETTER (OUTPATIENT)
Age: 22
End: 2022-05-14

## 2022-05-20 ENCOUNTER — TELEPHONE (OUTPATIENT)
Dept: BARIATRICS/WEIGHT MGMT | Facility: CLINIC | Age: 22
End: 2022-05-20

## 2022-05-20 NOTE — TELEPHONE ENCOUNTER
Bariatric Nutrition Consult     Name: Nas Baird   : 2000   AGE: 22 y.o.   MRN: [id number]      Consult Date: phone consult 22    Surgery desired: revision, post op sleeve 10/16    Height: 174cm                  Current weight: 325lbs at LOV     22                BMI: 48.7      Highest weight: 340lbs                            Lowest weight: post op sleeve 215lbs    Goals: 180lbs, weight regain following family illness        Past Medical History:   Diagnosis Date   • Anxiety and depression    • Dyspepsia    • Dyspnea on exertion    • Fatigue    • GERD (gastroesophageal reflux disease)     controlled on Omeprazole 20mg daily (x5 yrs)   • Hypertriglyceridemia    • IBS (irritable bowel syndrome)     chronic loose stools + cramping, ? food sensitivity   • Insomnia    • Morbid obesity (HCC)    • Postoperative nausea    • Sleep apnea     patient denies, although [place] records report h/o                                 Diet history reveals c/o cramping and loose stools , seeing GI today. Notices issues with protein drinks, no issues with lactose free drinks  Diet hx reveals 3 meals and 1 snack daily. High in carbs, average in protein  Breakfast: greek yogurt and belvita and apple slices  Snack: greek yogurt +/- apple slices  Lunch/snack: apples  Dinner: pasta with marinara sauce/tortellini/black bean tacos with cheese, lettuce, sour cream/chicken salad  Eats out : fast food- burger/chicken nuggets +/- fries      Protein sources: chicken, greek yogurt, beef, beans, turkey, eggs    Drinks: V8 energy, water, occasional carbonated beverage    Food allergies/intolerances: seeing GI today    Night eating: no    Patient has/has not been diagnosed with an eating disorder: no    Exercise/activity: none    Main bariatric nutrition principles discussed and explained. Patient needs to focus on 100g protein daily, 100-140g carbohydrates daily, healthy fat intake, 64 oz fluid daily, no carbonation, and try protein  drinks/protein powders. Patient verbalized understanding and queries were answered.  Additional nutritional counseling will be available      Aracely Ruiz RD,LD

## 2022-05-31 NOTE — PROGRESS NOTES
[facility]  [address]  [address]  [facility] [address]  [phone]      Patient  Name:  Nas Baird  :  2000      Date of Visit: 22    Chief Complaint:  weight gain; unable to maintain weight loss.   Evaluate for possible revisional metabolic and bariatric surgery    History of Present Illness:  Nas Baird is a 22 y.o. female who presents today for evaluation, education and consultation regarding revisional metabolic and bariatric surgery (MBS).  Since last seen 2021 she has lost 4 pounds.  Most recent intake evaluation dated 2021 Evelyn Franco PA-C reviewed.  She notes the patient is status post laparoscopic sleeve gastrectomy and hiatal hernia repair in 2016 with Dr. Petty in [place] and that he did not over 30 [place] bougie dilator and has reflux and that her initial intake evaluation was with Berna Craft PA-C on 2021 showing the patient's highest lifetime weight was 340 pounds presurgery weight 282 pounds with a BMI of 42 lowest weight 215 pounds.  She noted history of reflux unchanged after sleeve gastrectomy with hiatal hernia repair and controlled with daily PPI no dysphagia and that she has a history of IBS with chronic loose stools and cramping questionable food sensitivity no prior gallbladder evaluation no recent bariatric care and that she feels like she still has portion control and feels she eats healthy salads yogurt eggs drinks water and exercise varies but sometimes limited by her depression and that upper GI at [facility] on 2021 showed changes of vertical sleeve gastrectomy otherwise unremarkable.     The patient has had issues with morbid obesity for years and only temporary success with surgical and non-surgical methods of weight loss.  The patient is seeking revisional metabolic and bariatric surgery to help with the morbid obesity related conditions of anxiety and depression,  dyspnea on exertion, fatigue, GE reflux disease, chronic cholecystitis without cholelithiasis, hypertriglyceridemia, irritable bowel syndrome, insomnia, obstructive sleep apnea.    22-year-old morbidly obese female from [place].  Her mother is with her for today's evaluation.  She tells me her GE reflux is better since her sleeve gastrectomy and hiatal hernia repair.  She says she has diarrhea already usually 3-4 times a day.  Her father has antiphospholipid syndrome which was diagnosed after he presented with a stroke.  The patient says the [facility] HIDA scan did reproduce her right upper quadrant pain.  The patient is unsure if she can take Keflex.  She says 20 mg PPI daily controls her symptoms no dysphagia.  She takes ibuprofen as needed for menstrual cramps.  She says she is never been pregnant.  EGD on 8/16/2021 shows no obvious recurrent hiatal hernia Z-line at 39 cm no bile reflux gastritis and minimal sleeve dilatation no narrowing or twisting at the angularis.  I noted the time that she had the sleeve in [place] in October 2016 as a teenager and her original BMI was 42 and that she has nearly regained all of her weight although she has portion control.  I noted an upper GI read by Dr. Conner Cerda showed no reflux no hiatal hernia and changes of sleeve gastrectomy and then I had reviewed the images.  I did review my black-and-white EGD images.  Pathology of the antrum showed reactive antral mucosa with mild chronic gastritis and very focal activity negative for H. pylori distal esophageal biopsies showed reactive changes otherwise unremarkable.  Sound of the right upper quadrant dated 8/30/2021 unremarkable.  [facility] HIDA scan dated 11/21/2021 shows an ejection fraction of 9% and reproduction of the patient's symptoms.  Message thread from August 2021 noting that I required the patient get back on her CPAP and get a gallbladder work-up before I would see her back in the office to discuss options.      Past  Medical History:   Diagnosis Date   • Anxiety and depression    • Chronic cholecystitis without calculus    • Dyspepsia    • Dyspnea on exertion    • Fatigue    • GERD (gastroesophageal reflux disease)     controlled on Omeprazole 20mg daily (x5 yrs).  EGD Dr. Resendiz 8/16/2021, upper GI 6/2/2021   • Hypertriglyceridemia    • IBS (irritable bowel syndrome)     chronic loose stools + cramping, ? food sensitivity   • Insomnia    • Morbid obesity (HCC)    • Postoperative nausea    • Sleep apnea      Past Surgical History:   Procedure Laterality Date   • GASTRIC SLEEVE LAPAROSCOPIC  2016    s/p LSG/HHR 10/2016 w/ Dr. Petty in [place] (34 Fr bougie)   • TONSILLECTOMY  2003   • WISDOM TOOTH EXTRACTION  2017       Allergies   Allergen Reactions   • Adhesive Tape Rash   • Penicillins Hives     Uncertain if can tolerate Keflex.         Current Outpatient Medications:   •  cloNIDine (CATAPRES) 0.2 MG tablet, Take 0.4 mg by mouth Daily., Disp: , Rfl:   •  ethynodiol-ethinyl estradiol (KELNOR,ZOVIA) 1-35 MG-MCG per tablet, Take 1 tablet by mouth Daily., Disp: , Rfl:   •  hydrOXYzine (ATARAX) 50 MG tablet, Take 50 mg by mouth Daily., Disp: , Rfl:   •  lamoTRIgine (LaMICtal) 200 MG tablet, Take 200 mg by mouth Daily., Disp: , Rfl:   •  omeprazole (priLOSEC) 20 MG capsule, Take 20 mg by mouth Daily., Disp: , Rfl:   •  QUEtiapine (SEROquel) 25 MG tablet, , Disp: , Rfl:   •  venlafaxine XR (EFFEXOR-XR) 150 MG 24 hr capsule, , Disp: , Rfl:   •  venlafaxine XR (EFFEXOR-XR) 75 MG 24 hr capsule, , Disp: , Rfl:     Social History     Socioeconomic History   • Marital status: Single   Tobacco Use   • Smoking status: Never Smoker   • Smokeless tobacco: Never Used   Substance and Sexual Activity   • Alcohol use: Never   • Drug use: Never     Family History   Problem Relation Age of Onset   • Diabetes Mother    • Stroke Father    • No Known Problems Brother    • Skin cancer Maternal Grandmother    • Diabetes Maternal Grandmother    • Diabetes  Maternal Grandfather    • No Known Problems Paternal Grandmother    • Hypertension Paternal Grandfather    • Heart disease Paternal Grandfather    • Heart attack Paternal Grandfather    • Sleep apnea Paternal Grandfather        Review of Systems   Constitutional: Positive for fatigue and unexpected weight gain. Negative for chills, diaphoresis, fever and unexpected weight loss.   HENT: Negative for congestion and facial swelling.    Eyes: Negative for blurred vision, double vision and discharge.   Respiratory: Negative for chest tightness, shortness of breath and stridor.    Cardiovascular: Negative for chest pain, palpitations and leg swelling.   Gastrointestinal: Positive for diarrhea and GERD. Negative for blood in stool.   Endocrine: Negative for polydipsia.   Genitourinary: Negative for hematuria.   Musculoskeletal: Positive for arthralgias.   Skin: Negative for color change.   Allergic/Immunologic: Negative for immunocompromised state.   Neurological: Negative for confusion.   Psychiatric/Behavioral: Negative for self-injury. The patient is nervous/anxious.        I have reviewed the ROS and confirm that it's accurate today.    Physical Exam:  Vital Signs:  Weight: (!) 147 kg (325 lb)   Body mass index is 48.7 kg/m².  Temp: 96.9 °F (36.1 °C)   Heart Rate: 120   BP: 120/82     Physical Exam  Vitals reviewed.   Constitutional:       Appearance: She is well-developed.   HENT:      Head: Normocephalic and atraumatic.      Nose:      Comments: mask  Eyes:      Conjunctiva/sclera: Conjunctivae normal.      Pupils: Pupils are equal, round, and reactive to light.   Neck:      Thyroid: No thyromegaly.      Vascular: No carotid bruit.      Trachea: No tracheal deviation.   Cardiovascular:      Rate and Rhythm: Normal rate and regular rhythm.      Heart sounds: Normal heart sounds.   Pulmonary:      Effort: Pulmonary effort is normal. No respiratory distress.      Breath sounds: Normal breath sounds.   Abdominal:       General: There is no distension.      Palpations: Abdomen is soft.      Tenderness: There is no abdominal tenderness.      Comments: Faint laparoscopy scars   Musculoskeletal:         General: No deformity. Normal range of motion.      Cervical back: Normal range of motion and neck supple.   Skin:     General: Skin is warm and dry.      Findings: No rash.   Neurological:      Mental Status: She is alert and oriented to person, place, and time.      Cranial Nerves: No cranial nerve deficit.      Coordination: Coordination normal.   Psychiatric:         Behavior: Behavior normal.         Thought Content: Thought content normal.         Judgment: Judgment normal.         Patient Active Problem List   Diagnosis   • Anxiety and depression   • Morbid obesity (HCC)   • GERD (gastroesophageal reflux disease)   • Sleep apnea   • Hypertriglyceridemia   • Insomnia   • PONV (postoperative nausea and vomiting)   • Fatigue   • Dyspepsia   • Dyspnea on exertion   • IBS (irritable bowel syndrome)       Assessment:    Nas Baird is a 22 y.o. year old female with medically complicated obesity status post laparoscopic sleeve gastrectomy and hiatal hernia repair as a teenager in October 2016.    Revisional metabolic and bariatric surgery is deemed medically necessary given the following obesity related comorbidities including anxiety and depression, dyspnea on exertion, fatigue, GE reflux disease, chronic cholecystitis without cholelithiasis, hypertriglyceridemia, irritable bowel syndrome, insomnia, obstructive sleep apnea with current Weight: (!) 147 kg (325 lb) and Body mass index is 48.7 kg/m²..    We had a long discussion regarding the risk, benefits, and alternative therapies to revisional metabolic and bariatric surgical options.  I used flip charts and Internet diagrams.  I went over potential short and long-term complications and the need for lifelong vitamin, mineral supplementation and laboratory follow-up and the  possibility that further surgery may be warranted for potential complications, etc.  We discussed revision to a Reynaldo-en-Y gastric bypass, a modified duodenal switch, and a formal duodenal switch.  I strongly encouraged the patient to seek second opinion(s) and to consider referral to medical weight loss.    A copy of the patient's signed informed consent is on file.      Plan: After discussion of the risks, benefits, alternative therapies as above the patient wishes to proceed with laparoscopic possible robotic assisted modified duodenal switch/sips, cholecystectomy, and EGD.  She would like a 250 cm common channel.  She will attend informed consent class and I will see her back in the office for final evaluation prior to scheduling surgery.  No primary care provider listed.        Fransico Resendiz MD

## 2022-06-09 ENCOUNTER — TELEMEDICINE (OUTPATIENT)
Dept: PSYCHIATRY | Facility: CLINIC | Age: 22
End: 2022-06-09

## 2022-06-09 DIAGNOSIS — Z71.89 ENCOUNTER FOR PSYCHOLOGICAL ASSESSMENT PRIOR TO BARIATRIC SURGERY: ICD-10-CM

## 2022-06-09 DIAGNOSIS — E66.01 MORBID OBESITY: Primary | ICD-10-CM

## 2022-06-09 PROCEDURE — 90792 PSYCH DIAG EVAL W/MED SRVCS: CPT | Performed by: NURSE PRACTITIONER

## 2022-06-09 NOTE — PROGRESS NOTES
"This provider is located at [facility], [address] [place] [address], [facility], [id number] using a secure [employer] Video Visit through [employer]. Patient is being seen remotely via telehealth at their home address in [place], and stated they are in a secure environment for this session. The patient's condition being diagnosed/treated is appropriate for telemedicine. The provider identified herself as well as her credentials.   The patient, and/or patients guardian, consent to be seen remotely, and when consent is given they understand that the consent allows for patient identifiable information to be sent to a third party as needed.   They may refuse to be seen remotely at any time. The electronic data is encrypted and password protected, and the patient and/or guardian has been advised of the potential risks to privacy not withstanding such measures.   PT Identifiers used: Name and .    You have chosen to receive care through a telehealth visit.  Do you consent to use a video/audio connection for your medical care today? Yes      Subjective   Nas Baird is a 22 y.o. female who presents today for Psychiatric evaluation for consideration of bariatric surgery    Chief Complaint:  \" Wanting to have a revision of bariatric surgery\"    History of Present Illness:    Patient reports for appointment on time.  Reports above chief complaint.  States she is status post gastric sleeve in 2016.  She reports she had really good success with until her father had a stroke and he was in NICU for many weeks, they were at the hospital consistently with her father and she was stressed eating, she was eating unhealthy foods.  She had stopped exercising.  She subsequently gained back her weight plus a little more.  Reports highest weight prior to gastric sleeve was 320 pounds.  Current weight is 325.  She did get down to about 215 pounds after gastric sleeve.  So if she has gained back about 110 pounds. She denies any history of substance " abuse she denies any symptoms of justyn symptoms of flight of ideas, spending sprees, grandiosity, racing thoughts, and pressured speech.  Patient denied any symptoms of posttraumatic stress disorder including flashbacks, nightmares, or triggers.  She reports current medications help her depression and anxiety greatly, reporting PHQ-9 score today of only 5, ERICK-7 score of 7.  Patient reports she has long-term goal of maintaining weight loss, she wants to get back into a condition where she can exercise.  She has met with a dietitian, and she is encouraged to continue meeting with the dietitian.  She reports her support system is her parents.  I did review.JCRISKANDBENEFITS  Of having this type of surgery, changes in moods which can occur after surgery, and the need for lifelong vitamins and nutritional counseling.  Transfer of addiction was also discussed.       Last Menstrual Period:  Several months ago.  Patient reports she stays consistently on birth control pills and does not do the placebo week.    The following portions of the patient's history were reviewed and updated as appropriate: allergies, current medications, past family history, past medical history, past social history, past surgical history and problem list.    Past Psychiatric History:   Patient has been treated for anxiety and depressive symptoms for many years some of this related to her obesity.  She is currently taking lamotrigine, and Effexor XR.  She sees Opal Barnes for a psychiatric provider at [facility] in [place].  She also takes Seroquel 25 mg at bedtime for sleep, hydroxyzine as needed, and clonidine as needed for anxiety.  Patient reported a history of binge eating in the long past, she was bullied in elementary school, and started having anxiety and depressive symptoms about that time.  She reports she was around 9 years old.  She did enter therapy at that time as well.  She has been on several medications over the years  including Lexapro, Klonopin, Wellbutrin, propranolol, Cymbalta.  She denied any history of any self harming behaviors, no suicide attempts, no homicidal ideation, no suicidal ideation.  She has never been admitted for psychiatric reason to a facility.  She denies any history of hallucinations or psychosis. she has never been pregnant.      Past Medical History:  Past Medical History:   Diagnosis Date   • Anxiety and depression    • Chronic cholecystitis without calculus    • Dyspepsia    • Dyspnea on exertion    • Fatigue    • GERD (gastroesophageal reflux disease)     controlled on Omeprazole 20mg daily (x5 yrs).  EGD Dr. Resendiz 8/16/2021, upper GI 6/2/2021   • Hypertriglyceridemia    • IBS (irritable bowel syndrome)     chronic loose stools + cramping, ? food sensitivity   • Insomnia    • Morbid obesity (HCC)    • Postoperative nausea    • Sleep apnea        Substance Abuse History:   Types:Denies all, including illicit      Social History:  Social History     Socioeconomic History   • Marital status: Single   • Highest education level: Associate degree: occupational, technical, or vocational program   Tobacco Use   • Smoking status: Never Smoker   • Smokeless tobacco: Never Used   Substance and Sexual Activity   • Alcohol use: Never   • Drug use: Never       Family History:  Family History   Problem Relation Age of Onset   • Depression Mother    • Anxiety disorder Mother    • Diabetes Mother    • Stroke Father    • No Known Problems Brother    • Diabetes Maternal Grandfather    • Skin cancer Maternal Grandmother    • Diabetes Maternal Grandmother    • Hypertension Paternal Grandfather    • Heart disease Paternal Grandfather    • Heart attack Paternal Grandfather    • Sleep apnea Paternal Grandfather    • No Known Problems Paternal Grandmother        Past Surgical History:  Past Surgical History:   Procedure Laterality Date   • GASTRIC SLEEVE LAPAROSCOPIC  2016    s/p LSG/HHR 10/2016 w/ Dr. Petty in [place] (34 Fr  ebony)   • TONSILLECTOMY  2003   • WISDOM TOOTH EXTRACTION  2017       Problem List:  Patient Active Problem List   Diagnosis   • Anxiety and depression   • Morbid obesity (HCC)   • GERD (gastroesophageal reflux disease)   • Sleep apnea   • Hypertriglyceridemia   • Insomnia   • PONV (postoperative nausea and vomiting)   • Fatigue   • Dyspepsia   • Dyspnea on exertion   • IBS (irritable bowel syndrome)       Allergy:   Allergies   Allergen Reactions   • Adhesive Tape Rash   • Penicillins Hives     Uncertain if can tolerate Keflex.          Current Medications:   Current Outpatient Medications   Medication Sig Dispense Refill   • cloNIDine (CATAPRES) 0.2 MG tablet Take 0.4 mg by mouth Daily.     • ethynodiol-ethinyl estradiol (KELNOR,ZOVIA) 1-35 MG-MCG per tablet Take 1 tablet by mouth Daily.     • hydrOXYzine (ATARAX) 50 MG tablet Take 50 mg by mouth Daily.     • lamoTRIgine (LaMICtal) 200 MG tablet Take 200 mg by mouth Daily.     • omeprazole (priLOSEC) 20 MG capsule Take 20 mg by mouth Daily.     • QUEtiapine (SEROquel) 25 MG tablet      • venlafaxine XR (EFFEXOR-XR) 150 MG 24 hr capsule      • venlafaxine XR (EFFEXOR-XR) 75 MG 24 hr capsule        No current facility-administered medications for this visit.       Review of Systems:    Review of Systems      Physical Exam:   Physical Exam    Vitals:  There were no vitals taken for this visit. There is no height or weight on file to calculate BMI.  Due to extenuating circumstances and possible current health risks associated with the patient being present in a clinical setting (with current health restrictions in place in regards to possible COVID 19 transmission/exposure), the patient was seen remotely today via a MyChart Video Visit through [facility] and telephone encounter.  Unable to obtain vital signs due to nature of remote visit.  Height stated at 68.5 inches.  Weight stated at 325 pounds.    Last 3 Blood Pressure Readings:  BP Readings from Last 3 Encounters:    04/26/22 120/82   06/28/21 126/72   05/14/21 124/70       PHQ-9 Score:   PHQ-9 Total Score: (P) 5  PHQ-9 Depression Screening  Little interest or pleasure in doing things? (P) 1   Feeling down, depressed, or hopeless? (P) 1   Trouble falling or staying asleep, or sleeping too much? (P) 1   Feeling tired or having little energy? (P) 1   Poor appetite or overeating? (P) 0   Feeling bad about yourself - or that you are a failure or have let yourself or your family down? (P) 0   Trouble concentrating on things, such as reading the newspaper or watching television? (P) 1   Moving or speaking so slowly that other people could have noticed? Or the opposite - being so fidgety or restless that you have been moving around a lot more than usual? (P) 0   Thoughts that you would be better off dead, or of hurting yourself in some way? (P) 0   PHQ-9 Total Score (P) 5   If you checked off any problems, how difficult have these problems made it for you to do your work, take care of things at home, or get along with other people? (P) Somewhat difficult     PHQ-9 Total Score: (P) 5      Feeling nervous, anxious or on edge: (P) Several days  Not being able to stop or control worrying: (P) Several days  Worrying too much about different things: (P) Several days  Trouble Relaxing: (P) Several days  Being so restless that it is hard to sit still: (P) Several days  Feeling afraid as if something awful might happen: (P) Several days  Becoming easily annoyed or irritable: (P) Several days  ERICK 7 Total Score: (P) 7  If you checked any problems, how difficult have these problems made it for you to do your work, take care of things at home, or get along with other people: (P) Somewhat difficult      PROMIS scale screening tool that patient filled out virtually reviewed by this APRN at today's encounter.      Mental Status Exam:   Hygiene:   good  Cooperation:  Cooperative  Eye Contact:  Fair  Psychomotor Behavior:  Appropriate  Affect:  Full  range  Mood: euthymic  Hopelessness: Denies  Speech:  Normal  Thought Process:  Goal directed and Linear  Thought Content:  Normal  Suicidal:  None  Homicidal:  None  Hallucinations:  None  Delusion:  None  Memory:  Intact  Orientation:  Person, Place, Time and Situation  Reliability:  good  Insight:  Good  Judgement:  Good  Impulse Control:  Good  Physical/Medical Issues:  Yes Multiple chronic health issues which can improve with weight loss       Lab Results:   No visits with results within 6 Month(s) from this visit.   Latest known visit with results is:   Lab Requisition on 08/16/2021   Component Date Value Ref Range Status   • Case Report 08/16/2021    Final                    Value:Surgical Pathology Report                         Case: [id number]                                  Authorizing Provider:  Fransico Resendiz MD    Collected:           08/16/2021 12:16 PM          Ordering Location:     [facility]   Received:            08/16/2021 12:16 PM                                 LABORATORY                                                                   Pathologist:           Fransico Sharpe MD                                                            Specimens:   1) - Gastric, Antrum                                                                                2) - Esophagus, Distal                                                                    • Clinical Information 08/16/2021    Final                    Value:This result contains rich text formatting which cannot be displayed here.   • Final Diagnosis 08/16/2021    Final                    Value:This result contains rich text formatting which cannot be displayed here.   • Gross Description 08/16/2021    Final                    Value:This result contains rich text formatting which cannot be displayed here.   • Microscopic Description 08/16/2021    Final                    Value:This result contains rich text formatting which cannot  be displayed here.         Assessment & Plan   Diagnoses and all orders for this visit:    1. Morbid obesity (HCC) (Primary)    2. Encounter for psychological assessment prior to bariatric surgery        Visit Diagnoses:    ICD-10-CM ICD-9-CM   1. Morbid obesity (HCC)  E66.01 278.01   2. Encounter for psychological assessment prior to bariatric surgery  Z71.89 V65.49         GOALS:  Short Term Goals: Patient will be compliant with medication, and patient will have no significant medication related side effects.  Patient will be engaged in psychotherapy as indicated.  Patient will report subjective improvement of symptoms.  Long term goals: To stabilize mood and treat/improve subjective symptoms, the patient will stay out of the hospital, the patient will be at an optimal level of functioning, and the patient will take all medications as prescribed.  The patient/guardian verbalized understanding and agreement with goals that were mutually set.    TREATMENT PLAN: Continue psychotropic medications as prescribed by your Provider.  Patient acknowledged and verbally consented with current treatment plan and was educated on the importance of compliance with treatment and follow-up appointments.       - From a psychiatric standpoint, the patient presents as a  fair candidate for bariatric surgery.  The patient is motivated for the surgery, has voiced readiness for the lifestyle change in terms of adjusting eating habits, and seems to have appropriate expectations of how to prepare and how to live after surgery in order to lose weight successfully.  However, with patient's prior attempt at bariatric surgery weight loss, and failure to maintain, it is highly recommended that patient stay in constant counseling with dietitians and therapist until her weight goal is achieved and even after that.   The patient appears to have good coping skills, and a reported strong support system.  The patient reports her moods as well  controlled on her current psychotropic medication, and plans to follow closely with her prescriber for continued management of moods with her current psychotropic medication regimen.  This APRN has discussed how moods can be affected post bariatric surgery and it is not advisable at this time for the patient to stop her psychotropic medication regimen while moods are stable unless necessary, and the patient verbalizes understanding and agreement in her own words.  -From a psychiatric standpoint the patient is at low risk for any psychiatric complications related to proposed procedure.    MEDS ORDERED DURING VISIT:  No orders of the defined types were placed in this encounter.      No follow-ups on file.               This document has been electronically signed by ALBER Meyer  June 9, 2022 15:12 EDT    Please note that portions of this note were completed with a voice recognition program. Efforts were made to edit dictation, but occasionally words are mistranscribed.

## 2022-06-20 ENCOUNTER — TELEPHONE (OUTPATIENT)
Dept: BARIATRICS/WEIGHT MGMT | Facility: CLINIC | Age: 22
End: 2022-06-20

## 2022-06-20 NOTE — TELEPHONE ENCOUNTER
Patient called stating she wanting to know when her next appointment will be scheduled. She has had EGD performed, and has had her first appointments with behavioral health and a dietician. I reviewed her last office note and it looks like she is needing to be set up for an informed consent class. Please advise.

## 2022-09-04 ENCOUNTER — HEALTH MAINTENANCE LETTER (OUTPATIENT)
Age: 22
End: 2022-09-04

## 2023-07-22 ENCOUNTER — HEALTH MAINTENANCE LETTER (OUTPATIENT)
Age: 23
End: 2023-07-22

## 2024-04-18 ENCOUNTER — DOCUMENTATION (OUTPATIENT)
Dept: BARIATRICS/WEIGHT MGMT | Facility: CLINIC | Age: 24
End: 2024-04-18
Payer: COMMERCIAL

## 2024-04-18 ENCOUNTER — OFFICE VISIT (OUTPATIENT)
Dept: BARIATRICS/WEIGHT MGMT | Facility: CLINIC | Age: 24
End: 2024-04-18
Payer: COMMERCIAL

## 2024-04-18 VITALS
HEIGHT: 69 IN | OXYGEN SATURATION: 98 % | BODY MASS INDEX: 43.4 KG/M2 | RESPIRATION RATE: 18 BRPM | DIASTOLIC BLOOD PRESSURE: 88 MMHG | TEMPERATURE: 96.9 F | HEART RATE: 102 BPM | WEIGHT: 293 LBS | SYSTOLIC BLOOD PRESSURE: 136 MMHG

## 2024-04-18 DIAGNOSIS — E66.01 OBESITY, CLASS III, BMI 40-49.9 (MORBID OBESITY): Primary | ICD-10-CM

## 2024-04-18 PROCEDURE — 99214 OFFICE O/P EST MOD 30 MIN: CPT | Performed by: PHYSICIAN ASSISTANT

## 2024-04-18 RX ORDER — HYOSCYAMINE SULFATE 0.125 MG
0.12 TABLET ORAL EVERY 4 HOURS PRN
COMMUNITY

## 2024-04-18 RX ORDER — INFLIXIMAB 100 MG/10ML
INJECTION, POWDER, LYOPHILIZED, FOR SOLUTION INTRAVENOUS ONCE
COMMUNITY

## 2024-04-18 NOTE — PROGRESS NOTES
"[facility] BARIATRIC SURGERY  [address]  [place] [address]  [facility] [id number] [phone]      Patient  Name:  Nas Baird  :  2000      Date of Visit: 2024      Chief Complaint:  weight gain; unable to maintain weight loss      History of Present Illness:  Nas Baird is a 24 y.o. female who presents today for evaluation, education and consultation regarding revision metabolic and bariatric surgery with Dr. Resendiz.    She was seen for initial intake evaluation 21 by Berna Marte PA-C     \"s/p LSG/HHR 10/2016 w/ Dr. Petty in [place] ([address]).      Highest lifetime weight:  340 lbs.  Presurgery weight: 282 lbs w/ BMI 42.  Lowest weight: 215 lbs.  Current weight: 333 lbs.                  Hx GERD, unchanged s/p LSG/HHR, controlled w/ daily PPI.  Denies dysphagia/N/V/AP.   Reports hx IBS - chronic loose stools + cramping, ? food sensitivity.  Denies prior GB eval.  No recent UGI imaging or endoscopic evals.  No recent bariatric care.       Feels like she still has portion control.  Eats \"healthy\" - salads, yogurt, eggs.  Drinks water.  Exercise varies, but sometimes limited by her depression. \"    Today's update:     Patient's insurance denied revision surgery. Patient began having significant diarrhea in  and also developed a \"hole near her anus\". She was referred for colonoscopy and eventually seen at [facility]. Has been diagnosed with perianal Crohn's Disease and now following with [facility].  Patient reports her symptoms have been greatly improved since starting Remicade.  She tells me her GI physician believes revision surgery could help her condition and has encouraged her to follow back up with our office to discuss weight loss surgery.    Per HPI Dr. Gonzales  GI at [facility] 3/14/24:  \"Lana reports initially developing symptoms of diarrhea and abdominal pain 2021. Symptoms progressed to rectal passage of blood and mucous and then rectal pain 2022. She " "underwent EGD and colonoscopy for evaluation of symptoms 11/1/2022, which showed chronic nonspecific duodenitis; no pathologic abnormality on biopsies from right and left colon; focal detached lymphoid aggregate with associated non- necrotizing granuloma on biopsy from sigmoid colon; focal acute cryptitis on rectal biopsy. The findings were noted to be non- specific. Mr. Baird was then evaluated by colorectal surgery at [facility] where she underwent EUA 12/21/2022 leading to diagnosis of perianal Crohn's disease.\"      Recent imaging:    UGI at [facility] 6/2/21 IMPRESSION:  Status post vertical sleeve gastrectomy x5 years. There was  no evidence of extraluminal contrast. No postoperative strictures were  seen.     EGD 8/18/21 w/ Dr. Resendiz:     Findings: No obvious recurrent hiatal hernia.  Z-line at 39 cm.  No pyloric spasm or stenosis.  No bile reflux gastritis.  Minimal sleeve dilatation.  No twisting or narrowing at the angularis.     GBUS 8/30/21:   IMPRESSION:  No acute sonographic findings within the visualized right  upper quadrant.    HIDA scan 11/19/21:   IMPRESSION:  The gallbladder ejection fraction is abnormal. This raises  the concern for biliary dyskinesia.    Per Dr. Resendiz's consultation 4/26/2022:    \"Plan: After discussion of the risks, benefits, alternative therapies as above the patient wishes to proceed with laparoscopic possible robotic assisted modified duodenal switch/sips, cholecystectomy, and EGD.  She would like a 250 cm common channel.  She will attend informed consent class and I will see her back in the office for final evaluation prior to scheduling surgery.  No primary care provider listed. \"         Complete history has been obtained and discussed today, as pertinent to metabolic/ bariatric surgery.     Past Medical History:   Diagnosis Date    Anxiety and depression     Chronic cholecystitis without calculus     Dyspepsia     Dyspnea on exertion     Fatigue     GERD (gastroesophageal " reflux disease)     controlled on Omeprazole 20mg daily (x5 yrs).  EGD Dr. Resendiz 8/16/2021, upper GI 6/2/2021    Hypertriglyceridemia     IBS (irritable bowel syndrome)     chronic loose stools + cramping, ? food sensitivity    Insomnia     Iron deficiency anemia     s/p iron infusions    Morbid obesity     Perianal Crohn's disease     follows at [facility]; remicade    Postoperative nausea     Sleep apnea      Past Surgical History:   Procedure Laterality Date    GASTRIC SLEEVE LAPAROSCOPIC  2016    s/p LSG/HHR 10/2016 w/ Dr. Petty in [place] ([address])    TONSILLECTOMY  2003    WISDOM TOOTH EXTRACTION  2017       Allergies   Allergen Reactions    Adhesive Tape Rash    Penicillins Hives     Uncertain if can tolerate Keflex.         Current Outpatient Medications:     cloNIDine (CATAPRES) 0.2 MG tablet, Take 2 tablets by mouth Daily., Disp: , Rfl:     ethynodiol-ethinyl estradiol (KELNOR,ZOVIA) 1-35 MG-MCG per tablet, Take 1 tablet by mouth Daily., Disp: , Rfl:     hydrOXYzine (ATARAX) 50 MG tablet, Take 1 tablet by mouth Daily., Disp: , Rfl:     hyoscyamine (ANASPAZ,LEVSIN) 0.125 MG tablet, Take 1 tablet by mouth Every 4 (Four) Hours As Needed for Cramping., Disp: , Rfl:     inFLIXimab (Remicade) 100 MG injection, Infuse  into a venous catheter 1 (One) Time. Every 6 weeks, Disp: , Rfl:     lamoTRIgine (LaMICtal) 200 MG tablet, Take 1 tablet by mouth Daily., Disp: , Rfl:     omeprazole (priLOSEC) 20 MG capsule, Take 1 capsule by mouth Daily., Disp: , Rfl:     QUEtiapine (SEROquel) 25 MG tablet, , Disp: , Rfl:     venlafaxine XR (EFFEXOR-XR) 150 MG 24 hr capsule, , Disp: , Rfl:     venlafaxine XR (EFFEXOR-XR) 75 MG 24 hr capsule, , Disp: , Rfl:     Social History     Socioeconomic History    Marital status: Single    Highest education level: Associate degree: occupational, technical, or vocational program   Tobacco Use    Smoking status: Never    Smokeless tobacco: Never   Vaping Use    Vaping status: Never Used    Substance and Sexual Activity    Alcohol use: Never    Drug use: Never     Social History     Social History Narrative    Lives in [place], [place] w/ her parents. Currently not working.         Family History   Problem Relation Age of Onset    Depression Mother     Anxiety disorder Mother     Diabetes Mother     Stroke Father     No Known Problems Brother     Diabetes Maternal Grandfather     Skin cancer Maternal Grandmother     Diabetes Maternal Grandmother     Hypertension Paternal Grandfather     Heart disease Paternal Grandfather     Heart attack Paternal Grandfather     Sleep apnea Paternal Grandfather     No Known Problems Paternal Grandmother        Review of Systems:  Constitutional:  reports fatigue, weight gain and denies fevers, chills.  HEENT:  denies headache, ear pain or loss of hearing, blurred or double vision, nasal discharge or sore throat.  Cardiovascular:  reports HLD and denies HTN, CAD, Atrial Fib, hx heart disease, heart murmur, hx MI, chest pain, palpitations, edema, hx DVT.  Respiratory:  reports dyspnea on exertion, sleep apnea and denies shortness of breath , cough , wheezing, asthma, COPD, hx PE.  Gastrointestinal:  reports heartburn, abdominal pain, diarrhea, gallbladder issues and denies dysphagia, nausea, vomiting, IBS, constipation, melena, blood in stool, liver disease, hx pancreatitis.  Genitourinary:  reports none and denies history of  frequent UTI, incontinence, hematuria, dysuria, polyuria, polydipsia, renal insufficiency, renal failure.    Musculoskeletal:  reports none and denies joint pain, fibromyalgia, arthritis, and autoimmune disease.  Neurological:  reports none and denies headaches, migraines, numbness /tingling, dizziness, confusion, seizure, stroke.  Psychiatric:  reports hx depression, hx anxiety and denies depressed mood, feeling anxious, bipolar disorder, suicidal ideation, hx suicide attempt, hx self injury, hx substance abuse, eating disorder.  Endocrine:  reports  none and denies PCOS, diabetes, thyroid disease.  Hematologic:  reports hx anemia and denies bruising, bleeding disorder, hx blood transfusion.  Skin:  reports none and denies rashes, hx MRSA.    Physical Exam:  Vital Signs:  Weight: (!) 151 kg (332 lb)   Body mass index is 49.75 kg/m².  Temp: 96.9 °F (36.1 °C)   Heart Rate: 102   BP: 136/88     Physical Exam  Constitutional:       Appearance: She is obese.   HENT:      Head: Normocephalic and atraumatic.   Eyes:      Extraocular Movements: Extraocular movements intact.      Conjunctiva/sclera: Conjunctivae normal.   Cardiovascular:      Rate and Rhythm: Normal rate and regular rhythm.   Pulmonary:      Effort: Pulmonary effort is normal.      Breath sounds: Normal breath sounds.   Abdominal:      General: Bowel sounds are normal. There is no distension.      Palpations: Abdomen is soft. There is no mass.      Tenderness: There is no abdominal tenderness.      Comments: Old lap scars   Musculoskeletal:         General: Normal range of motion.      Cervical back: Normal range of motion and neck supple.   Skin:     General: Skin is warm and dry.   Neurological:      General: No focal deficit present.      Mental Status: She is alert and oriented to person, place, and time.   Psychiatric:         Mood and Affect: Mood normal.         Behavior: Behavior normal.         Thought Content: Thought content normal.         Judgment: Judgment normal.         Patient Active Problem List   Diagnosis    Anxiety and depression    Morbid obesity    GERD (gastroesophageal reflux disease)    Sleep apnea    Hypertriglyceridemia    Insomnia    PONV (postoperative nausea and vomiting)    Fatigue    Dyspepsia    Dyspnea on exertion    IBS (irritable bowel syndrome)    Perianal Crohn's disease       Assessment:  24 y.o. female with medically complicated obesity pursuing Revision.    Metabolic & Bariatric Surgery is deemed medically necessary given the following: Class 3 Severe Obesity  (BMI >=40). Obesity-related health conditions include the following:  SINGER, GERD, KIMBERLY, fatigue, depression/anxiety . Obesity is worsening. BMI is is above average; BMI management plan is completed. We discussed consulting a Bariatric surgeon.        Plan:  Will obtain clearance for revision surgery from GI given new diagnosis of Crohn's Disease (see Dr. Resendiz note below). If clearance is given, ok to schedule discuss options appointment with Dr. Resendiz. No need to repeat UGI or EGD at this time.     Fransico Resendiz MD Gerard, Anna A, PA  Let pt/GI know that revision (whether gastric bypass or duodenal switch) will involve a malabsorptive procedure.  This would bring up to concerns with a history of Crohn's disease: 1) small bowel anastomoses and 2) increased BM's/diarrhea.  Not sure they would want her to proceed with these types of surgery given her Crohn's.  Then again if they feel that her disease is confined to rectal Crohn's and well-controlled on current therapy then they may give the greenlight.  If they do not feel comfortable with these type of revisions, the only thing I could offer her would be resleeve with the understanding it may not lead to significant weight loss.  So I would get a note from GI addressing the above and then have her see me in the office to discuss options.  No need to repeat her EGD or upper GI at this time if there has not been a significant change in her symptoms and her reflux is still controlled with daily PPI.  She is of course more than welcome to seek second opinion(s).  Thanks!    Patient understands that bariatric surgery is not cosmetic surgery but rather a tool to help make a lifelong commitment to lifestyle changes including diet, exercise and behavior modifications.  The patient has been educated today on those expected postoperative lifestyle changes.  Psychological and Nutritional consultations will be completed prior to surgery.  Instructions on how to access [employer]  (an internet based site w/ educational surgical videos) were given to the patient.  Recommended perioperative vitamin supplementation was reviewed.  The importance of avoiding ASA/ NSAIDS/ steroids/ tobacco/nicotine/ hormones/ immunomodulators perioperatively was discussed in detail.  All questions/concerns have been addressed.      Further input to follow pending the above.           REMI Blackman

## 2024-04-18 NOTE — PROGRESS NOTES
"Weight Loss Surgery  Presurgical Nutrition Assessment     Nas Baird  04/18/2024  [id number]  [id number]  2000   female    Surgery desired: LSG (sleeve gastrectomy)     HEIGHT 174 cm (68.5\")   WEIGHT 151 kg (332 #)   BMI 49.75    Highest weight ever: 128 kg (282 #)      Allergies   Allergen Reactions    Adhesive Tape Rash    Penicillins Hives     Uncertain if can tolerate Keflex.         Current Outpatient Medications:     cloNIDine (CATAPRES) 0.2 MG tablet, Take 2 tablets by mouth Daily., Disp: , Rfl:     ethynodiol-ethinyl estradiol (KELNOR,ZOVIA) 1-35 MG-MCG per tablet, Take 1 tablet by mouth Daily., Disp: , Rfl:     hydrOXYzine (ATARAX) 50 MG tablet, Take 1 tablet by mouth Daily., Disp: , Rfl:     hyoscyamine (ANASPAZ,LEVSIN) 0.125 MG tablet, Take 1 tablet by mouth Every 4 (Four) Hours As Needed for Cramping., Disp: , Rfl:     inFLIXimab (Remicade) 100 MG injection, Infuse  into a venous catheter 1 (One) Time. Every 6 weeks, Disp: , Rfl:     lamoTRIgine (LaMICtal) 200 MG tablet, Take 1 tablet by mouth Daily., Disp: , Rfl:     omeprazole (priLOSEC) 20 MG capsule, Take 1 capsule by mouth Daily., Disp: , Rfl:     QUEtiapine (SEROquel) 25 MG tablet, , Disp: , Rfl:     venlafaxine XR (EFFEXOR-XR) 150 MG 24 hr capsule, , Disp: , Rfl:     venlafaxine XR (EFFEXOR-XR) 75 MG 24 hr capsule, , Disp: , Rfl:       Subjective information: Patient has been consulted at [facility], she states, and now has learned that she is lactose and gluten intolerant and has a diagnosis of IBS and Crohn's disease.  She has been following a FODMOP diet with success.  She lives with her mother and father, and she states she does her cooking. She now uses True Nutrition brown rice protein powder with 20-25 g protein per serving.    Dislikes pre-made FODMOP meals that can be delivered      Nutrition Recall   Example of Usual 24 hour intake:     Breakfast: lactose free yogurt, Be[employer]ita crackers, 2 cuties OR gluten free cereal with lactose " free lactaid milk /c coffee & Zero-sugar creamer    Lunch: at 3-4 pm - gluten-free bagel or gf english muffin + an egg with tea or coffee, Zero sugar creamer    Dinner: eggs & rice OR roast with potato OR steak with salad OR soup  (she noted that she ate True Nutrition brown rice protein     Snacks: banana, orange, protein beverage, as above    Beverages of Choice: water, lactose free milk    Food Allergies or Intolerances: multiple, as above          Exercise / Activity: slowly taking more walks - no personal planned exercise program      Assessment of Nutritional Adequacy, Excessive Intake or Deficiencies:        Protein intake is inadequate to ensure successful weight loss. Too little in too few eating episodes                                       Processed / simple Carbohydrate intake is: much in bread, rice, potatoes, cereal crackers, etc                                       Balance of diet with a variety of fruits and vegetables is: few vegetables - unbalanced                                                       Reliance on restaurant food including fast food is: low - eats out infrequently                                                                          Ingestion of sweet beverages eg soda, sweet tea, fruit juice: not a problem for this patient.      Education    Provided Nutrition Guidelines for Bariatric and Metabolic Surgery   Reviewed guidelines for higher protein, limited carbohydrate diet to promote weight loss.  Encouraged patient to incorporate these principles of healthy eating.    Educated patient to wisely choose an appropriate protein supplement beverage for the post-surgery liquid diet.  Provided product guidelines and examples.    Explained importance of goal setting to help in changing eating behaviors that are not conducive to weight loss.  Specific macronutrient goals as below.   Provided follow-up options for support, including contact information for dietitians here.      Discussed importance of tracking grams of protein and carbohydrate in diet.  Web-based support information and apps for smart phones and computers given.        Nutrition Goals   Protein goal:  grams per day in three regular balanced meals and two to three high protein snacks each day, to ensure desired weight loss.   Carbohydrate goal:  100-140 grams per day  Beverage goal: Appropriate non-carbonated beverage intake.  Patient to wean self off of any sweet beverages, including soda.    Exercise Goals  Continue current exercise routine, if appropriate, and obtain approval from caregiver if physically limited for any reason.   Start activity plan per PCP/specialist advice if not currently exercising.     Recommend that team proceed with surgery and follow per protocol.   Genia Garcia RD  04/18/2024  10:49 EDT

## 2024-06-04 ENCOUNTER — CONSULT (OUTPATIENT)
Dept: BARIATRICS/WEIGHT MGMT | Facility: CLINIC | Age: 24
End: 2024-06-04
Payer: COMMERCIAL

## 2024-06-04 VITALS
WEIGHT: 293 LBS | SYSTOLIC BLOOD PRESSURE: 138 MMHG | HEART RATE: 114 BPM | TEMPERATURE: 96.9 F | DIASTOLIC BLOOD PRESSURE: 76 MMHG | HEIGHT: 69 IN | BODY MASS INDEX: 43.4 KG/M2

## 2024-06-04 DIAGNOSIS — Z98.84 S/P LAPAROSCOPIC SLEEVE GASTRECTOMY: Primary | ICD-10-CM

## 2024-06-04 DIAGNOSIS — E78.1 HYPERTRIGLYCERIDEMIA: ICD-10-CM

## 2024-06-04 DIAGNOSIS — G47.30 SLEEP APNEA, UNSPECIFIED TYPE: ICD-10-CM

## 2024-06-04 DIAGNOSIS — K50.113 CROHN'S DISEASE OF PERIANAL REGION WITH FISTULA: ICD-10-CM

## 2024-06-04 PROCEDURE — 99214 OFFICE O/P EST MOD 30 MIN: CPT | Performed by: SURGERY

## 2024-06-04 NOTE — LETTER
"2024     Heena Brunner DO  [address]  [address]  [facility] [address]    Patient: Nas Baird   YOB: 2000   Date of Visit: 2024     Dear Heena Brunner DO:       Thank you for referring Nas Baird to me for evaluation. Below are the relevant portions of my assessment and plan of care.    If you have questions, please do not hesitate to call me. I look forward to following Nas along with you.         Sincerely,        Fransico Resendiz MD        CC: No Recipients    Fransico Resendiz MD  24 1052  Sign when Signing Visit  [facility] BARIATRIC SURGERY  [address]  [address]  [facility] [id number] [phone]      Patient  Name:  Nas Baird  :  2000      Date of Visit: 24    Chief Complaint:  weight gain; unable to maintain weight loss.   Reevaluate for possible revisional metabolic and bariatric surgery status post laparoscopic sleeve gastrectomy and hiatal hernia repair 2016 in [place]    History of Present Illness:  Nas Baird is a 24 y.o. female who presents today for reevaluation, education and consultation regarding metabolic and bariatric surgery (MBS).  Since last seen 2024 she has gained 4 pounds.  Most recent evaluation Jania MELTON PA-C dated 2024 reviewed.  She notes the patient was seen for initial intake evaluation in May 2021 by Berna MONTERO PA-C\" from that visit that the patient had sleeve gastrectomy and hiatal hernia pair in 2016 with Dr. Petty in [place] over 34 Kosovan bougie dilator and her highest lifetime weight was 340 pounds and prior to surgery she weighed 282 pounds with a BMI of 42 and lowest weight achieved was 215 pounds currently weighs 333 pounds has a history of GE reflux unchanged after sleeve gastrectomy and hiatal hernia pair controlled with daily PPI no dysphagia nausea vomiting or abdominal pain history of IBS with chronic loose stools and cramping " "questionable food sensitivity no prior gallbladder evaluation no recent upper GI imaging or endoscopies and feels she still has portion control eats healthy salads yogurts eggs drinks water exercise varies but limited by her depression.      I reviewed my evaluation dated 4/26/2022:     \"Nas Baird is a 22 y.o. female who presents today for evaluation, education and consultation regarding revisional metabolic and bariatric surgery (MBS).  Since last seen 6/28/2021 she has lost 4 pounds.  Most recent intake evaluation dated 6/28/2021 Evelyn Franco PA-C reviewed.  She notes the patient is status post laparoscopic sleeve gastrectomy and hiatal hernia repair in October 2016 with Dr. Petty in [place] and that he did not over 30 Tristanian bougie dilator and has reflux and that her initial intake evaluation was with Berna Craft PA-C on 5/14/2021 showing the patient's highest lifetime weight was 340 pounds presurgery weight 282 pounds with a BMI of 42 lowest weight 215 pounds.  She noted history of reflux unchanged after sleeve gastrectomy with hiatal hernia repair and controlled with daily PPI no dysphagia and that she has a history of IBS with chronic loose stools and cramping questionable food sensitivity no prior gallbladder evaluation no recent bariatric care and that she feels like she still has portion control and feels she eats healthy salads yogurt eggs drinks water and exercise varies but sometimes limited by her depression and that upper GI at [facility] on 6/2/2021 showed changes of vertical sleeve gastrectomy otherwise unremarkable.      The patient has had issues with morbid obesity for years and only temporary success with surgical and non-surgical methods of weight loss.  The patient is seeking revisional metabolic and bariatric surgery to help with the morbid obesity related conditions of anxiety and depression, dyspnea on exertion, fatigue, GE reflux disease, chronic cholecystitis " without cholelithiasis, hypertriglyceridemia, irritable bowel syndrome, insomnia, obstructive sleep apnea.     22-year-old morbidly obese female from [place].  Her mother is with her for today's evaluation.  She tells me her GE reflux is better since her sleeve gastrectomy and hiatal hernia repair.  She says she has diarrhea already usually 3-4 times a day.  Her father has antiphospholipid syndrome which was diagnosed after he presented with a stroke.  The patient says the [facility] HIDA scan did reproduce her right upper quadrant pain.  The patient is unsure if she can take Keflex.  She says 20 mg PPI daily controls her symptoms no dysphagia.  She takes ibuprofen as needed for menstrual cramps.  She says she is never been pregnant.  EGD on 8/16/2021 shows no obvious recurrent hiatal hernia Z-line at 39 cm no bile reflux gastritis and minimal sleeve dilatation no narrowing or twisting at the angularis.  I noted the time that she had the sleeve in [place] in October 2016 as a teenager and her original BMI was 42 and that she has nearly regained all of her weight although she has portion control.  I noted an upper GI read by Dr. Conner Cerda showed no reflux no hiatal hernia and changes of sleeve gastrectomy and then I had reviewed the images.  I did review my black-and-white EGD images.  Pathology of the antrum showed reactive antral mucosa with mild chronic gastritis and very focal activity negative for H. pylori distal esophageal biopsies showed reactive changes otherwise unremarkable.  Sound of the right upper quadrant dated 8/30/2021 unremarkable.  [facility] HIDA scan dated 11/21/2021 shows an ejection fraction of 9% and reproduction of the patient's symptoms.  Message thread from August 2021 noting that I required the patient get back on her CPAP and get a gallbladder work-up before I would see her back in the office to discuss options.....    Assessment:     Nas Baird is a 22 y.o. year old female with  "medically complicated obesity status post laparoscopic sleeve gastrectomy and hiatal hernia repair as a teenager in October 2016.     Revisional metabolic and bariatric surgery is deemed medically necessary given the following obesity related comorbidities including anxiety and depression, dyspnea on exertion, fatigue, GE reflux disease, chronic cholecystitis without cholelithiasis, hypertriglyceridemia, irritable bowel syndrome, insomnia, obstructive sleep apnea with current Weight: (!) 147 kg (325 lb) and Body mass index is 48.7 kg/m²..     We had a long discussion regarding the risk, benefits, and alternative therapies to revisional metabolic and bariatric surgical options.  I used flip charts and Internet diagrams.  I went over potential short and long-term complications and the need for lifelong vitamin, mineral supplementation and laboratory follow-up and the possibility that further surgery may be warranted for potential complications, etc.  We discussed revision to a Reynaldo-en-Y gastric bypass, a modified duodenal switch, and a formal duodenal switch.  I strongly encouraged the patient to seek second opinion(s) and to consider referral to medical weight loss.     A copy of the patient's signed informed consent is on file.        Plan: After discussion of the risks, benefits, alternative therapies as above the patient wishes to proceed with laparoscopic possible robotic assisted modified duodenal switch/sips, cholecystectomy, and EGD.  She would like a 250 cm common channel.  She will attend informed consent class and I will see her back in the office for final evaluation prior to scheduling surgery.  No primary care provider listed.\"    Jania MELTON PA-C update on 4/18/2024 she noted patient's insurance denied revision surgery and she began having significant diarrhea in 2022 and developed a hole near her anus was referred for colonoscopy and eventually seen at the [facility] was diagnosed with perianal Crohn's " disease and now following with [facility] and that her symptoms have been greatly improved since starting Remicade and that her GI physician believes revision surgery could help her condition and is encouraged her to follow-up back with her office to discuss weight loss surgery as per HPI Dr. Ramos being GI at  in 3/14/2024 Cate reports initially developing symptoms of diarrhea and abdominal pain in January 2021 symptoms progressed to rectal passage of bloody mucus and then rectal pain in May 2022 she underwent EGD and colonoscopy for evaluation of her symptoms in November 2022 which showed chronic nonspecific duodenitis no pathologic abnormality and biopsies from the right and left colon focal detached lymphoid aggregate with associated nonnecrotizing granuloma biopsy from the sigmoid colon focal acute cryptitis on colorectal biopsy findings were noted to be nonspecific she was then evaluated by colorectal surgery at [facility] where she underwent EUA in December 2022 leading to a diagnosis of perianal Crohn's disease.  Jania MELTON PA-C notes recent imaging at [facility] in June 2021 shows vertical sleeve gastrectomy otherwise unremarkable and that EGD with Dr. Resendiz in August 2021 showed no obvious recurrent hiatal hernia Z-line 39 cm no pyloric spasm or stenosis no bile reflux gastritis minimal sleeve dilatation no twisting or narrowing at the angularis and the gallbladder ultrasound in August 2021 was unremarkable and HIDA scan in November 21 showed ejection fraction that was abnormal concerning for biliary dyskinesia and then she listed my plan from my consultation on 4/26/2022.     The patient has had issues with morbid obesity for years and only temporary success with non-surgical methods of weight loss.  The patient is seeking LSG to help with the morbid obesity related conditions of anxiety and depression, chronic cholecystitis without calculus, dyspnea on exertion, fatigue, GE  reflux disease, hypertriglyceridemia, perianal Crohn's disease, insomnia, iron deficiency anemia status post iron infusions, postoperative nausea and vomiting, untreated obstructive sleep apnea, irritable bowel syndrome.    24-year-old female from [facility].  She comes in today to discuss options.  As above her original sleeve gastrectomy and hiatal hernia repair were in [place] when she was age 16.  Her weight is now higher than preoperatively and close to her maximum lifetime weight, her BMI is now 50.4 consistent with super morbid obesity.  She said I did her mother's sleeve gastrectomy in April.  The patient says she already avoids corn syrup.  Her insurance is [employer] which will not cover the safer and preferred ASMBS-endorsed modified duodenal switch/SIPS/SAD-I and therefore she is now interested in a formal duodenal switch.  Her tachycardia in the office today is asymptomatic.  She says she does not treat her sleep apnea.  She says she has never been pregnant.  She has done a lot of research on the duodenal switch and inquires about potentially foul odors to her stool and if Devrom really helps.  She says her Crohn's seems to be isolated to the rectum and that her GI doctor at  is supportive of her proceeding with duodenal switch.  She gets occasional crampy pain, not right upper quadrant pain, for which she takes Levsin as needed.  She attributes this pain to her Crohn's despite saying the Crohn's does not affect her intestines, just her rectum.  Her gallbladder remains intact and she previously had a decreased ejection fraction of 9% on workup when I saw her originally.  It sounds as if one of her family members had carotid disease but she denies personal or family history of bleeding or clotting disorders.      Past Medical History:   Diagnosis Date   • Anxiety and depression    • Chronic cholecystitis without calculus    • Dyspepsia    • Dyspnea on exertion    • Fatigue     • GERD (gastroesophageal reflux disease)     controlled on Omeprazole 20mg daily (x5 yrs).  EGD Dr. Resendiz 8/16/2021, upper GI 6/2/2021   • Hypertriglyceridemia    • IBS (irritable bowel syndrome)     chronic loose stools + cramping, ? food sensitivity   • Insomnia    • Iron deficiency anemia     s/p iron infusions   • Morbid obesity    • Perianal Crohn's disease     follows at [facility]; remicade   • Postoperative nausea    • Sleep apnea      Past Surgical History:   Procedure Laterality Date   • GASTRIC SLEEVE LAPAROSCOPIC  2016    s/p LSG/HHR 10/2016 w/ Dr. Petty in [place] (34 Fr bougie)   • TONSILLECTOMY  2003   • WISDOM TOOTH EXTRACTION  2017       Allergies   Allergen Reactions   • Adhesive Tape Rash   • Penicillins Hives     Uncertain if can tolerate Keflex.         Current Outpatient Medications:   •  cloNIDine (CATAPRES) 0.2 MG tablet, Take 2 tablets by mouth Daily., Disp: , Rfl:   •  ethynodiol-ethinyl estradiol (KELNOR,ZOVIA) 1-35 MG-MCG per tablet, Take 1 tablet by mouth Daily., Disp: , Rfl:   •  hydrOXYzine (ATARAX) 50 MG tablet, Take 1 tablet by mouth Daily., Disp: , Rfl:   •  hyoscyamine (ANASPAZ,LEVSIN) 0.125 MG tablet, Take 1 tablet by mouth Every 4 (Four) Hours As Needed for Cramping., Disp: , Rfl:   •  inFLIXimab (Remicade) 100 MG injection, Infuse  into a venous catheter 1 (One) Time. Every 6 weeks, Disp: , Rfl:   •  lamoTRIgine (LaMICtal) 200 MG tablet, Take 1 tablet by mouth Daily., Disp: , Rfl:   •  omeprazole (priLOSEC) 20 MG capsule, Take 1 capsule by mouth Daily., Disp: , Rfl:   •  QUEtiapine (SEROquel) 25 MG tablet, , Disp: , Rfl:   •  venlafaxine XR (EFFEXOR-XR) 150 MG 24 hr capsule, , Disp: , Rfl:   •  venlafaxine XR (EFFEXOR-XR) 75 MG 24 hr capsule, , Disp: , Rfl:     Social History     Socioeconomic History   • Marital status: Single   • Highest education level: Associate degree: occupational, technical, or vocational program   Tobacco Use   • Smoking status: Never   • Smokeless tobacco:  Never   Vaping Use   • Vaping status: Never Used   Substance and Sexual Activity   • Alcohol use: Never   • Drug use: Never     Family History   Problem Relation Age of Onset   • Depression Mother    • Anxiety disorder Mother    • Diabetes Mother    • Stroke Father    • No Known Problems Brother    • Diabetes Maternal Grandfather    • Skin cancer Maternal Grandmother    • Diabetes Maternal Grandmother    • Hypertension Paternal Grandfather    • Heart disease Paternal Grandfather    • Heart attack Paternal Grandfather    • Sleep apnea Paternal Grandfather    • No Known Problems Paternal Grandmother        Review of Systems   Constitutional:  Positive for fatigue and unexpected weight gain. Negative for chills, diaphoresis, fever and unexpected weight loss.   HENT:  Negative for congestion and facial swelling.    Eyes:  Negative for blurred vision, double vision and discharge.   Respiratory:  Negative for chest tightness, shortness of breath and stridor.    Cardiovascular:  Negative for chest pain, palpitations and leg swelling.   Gastrointestinal:  Positive for abdominal pain, diarrhea and GERD. Negative for blood in stool.   Endocrine: Negative for polydipsia.   Genitourinary:  Negative for hematuria.   Musculoskeletal:  Positive for arthralgias.   Skin:  Negative for color change.   Allergic/Immunologic: Negative for immunocompromised state.   Neurological:  Negative for confusion.   Psychiatric/Behavioral:  Positive for sleep disturbance. Negative for self-injury.        I have reviewed the ROS and confirm that it's accurate today.    Physical Exam:  Vital Signs:  Weight: (!) 152 kg (336 lb)   Body mass index is 50.35 kg/m².  Temp: 96.9 °F (36.1 °C)   Heart Rate: 114   BP: 138/76     Physical Exam  Vitals reviewed.   Constitutional:       Appearance: She is well-developed.   HENT:      Head: Normocephalic and atraumatic.      Nose: Nose normal.   Eyes:      Conjunctiva/sclera: Conjunctivae normal.      Pupils:  Pupils are equal, round, and reactive to light.   Neck:      Thyroid: No thyromegaly.      Vascular: No carotid bruit.      Trachea: No tracheal deviation.   Cardiovascular:      Rate and Rhythm: Regular rhythm. Tachycardia present.      Heart sounds: Normal heart sounds.   Pulmonary:      Effort: Pulmonary effort is normal. No respiratory distress.      Breath sounds: Normal breath sounds.   Abdominal:      General: There is no distension.      Palpations: Abdomen is soft.      Tenderness: There is no abdominal tenderness.      Comments: Barely visible laparoscopy scars   Musculoskeletal:         General: No deformity. Normal range of motion.      Cervical back: Normal range of motion and neck supple.   Skin:     General: Skin is warm and dry.      Findings: No rash.   Neurological:      Mental Status: She is alert and oriented to person, place, and time.      Cranial Nerves: No cranial nerve deficit.      Coordination: Coordination normal.   Psychiatric:         Behavior: Behavior normal.         Thought Content: Thought content normal.         Judgment: Judgment normal.         Patient Active Problem List   Diagnosis   • Anxiety and depression   • Morbid obesity   • GERD (gastroesophageal reflux disease)   • Sleep apnea   • Hypertriglyceridemia   • Insomnia   • PONV (postoperative nausea and vomiting)   • Fatigue   • Dyspepsia   • Dyspnea on exertion   • IBS (irritable bowel syndrome)   • Perianal Crohn's disease       Assessment:    Nas Baird is a 24 y.o. year old female with medically complicated super morbid obesity status post laparoscopic sleeve gastrectomy in [place] in May 2021  when she was 16 years old.    Revisional metabolic and bariatric surgery is deemed medically necessary given the following obesity related comorbidities including anxiety and depression, chronic cholecystitis without calculus, dyspnea on exertion, fatigue, GE reflux disease, hypertriglyceridemia, perianal Crohn's disease,  insomnia, iron deficiency anemia status post iron infusions, postoperative nausea and vomiting, untreated obstructive sleep apnea, irritable bowel syndrome with current Weight: (!) 152 kg (336 lb) and Body mass index is 50.35 kg/m²..    Patient is aware that surgery is a tool, and that weight loss and improvement in comorbidities is not guaranteed but only seen in the context of appropriate use, follow up and physical activity.      I reviewed her Alfonso report which is negative.  Please see scanned records that I have reviewed and signed off on today.  All of this in addition to the patient's unique history and exam has been taken into consideration in determining their appropriate candidacy for MBS.    Risks, benefits, alternative therapies were discussed to laparoscopic possible robotic assisted revision of her sleeve gastrectomy to a duodenal switch.  The most common short term complics in addition to cardiopulm risk, VTE, bleeding, infection, etc is leak or obstruction at the anastomosis, which could have potentially serious and even fatal consequences and require further surgery(s).  The patient understands that they will have more frequent BM's, will be malodorous, and that at increased risk for vitamin deficiencies, sara the fat soluble vitamins, and that this can be serious and irreversible - still wishes to proceed, says they will be compliant w f/u and vit/supplment recommendations.  Aware that may need an elongation procedure in the future if diarrhea and/or vitamin issues not controlled.      The patient is aware if a hiatal hernia is encountered, it likely will be repaired.  R/B/A Rx to hiatal hernia repair were discussed as outlined in our long consent form.  Briefly risks in addition to those for duodenal switch include recurrent hernia, GIUSEPPE, dysphagia, esophageal injury, pneumothorax, injury to the vagus nerves, injury to the thoracic duct, aorta or vena cava.    R/b/a rx discussed including but not  limited to bleeding, infection, bile leak, bile duct injury, bowel injury, pulm complications, venothromboembolic events, death etc and wishes to proceed with concomitant lap rosalba.  Aware may not alleviate symptoms and further work up may be warranted.       Plan:   After discussion of the revisional metabolic and bariatric surgical options and the risks, benefits, and alternative therapies as above she wishes to proceed with laparoscopic robotic assisted revision of her previous sleeve gastrectomy to a formal duodenal switch, cholecystectomy, and EGD.  Once again her [employer] insurance will not cover  the safer and preferred ASMBS-endorsed modified duodenal switch/SIPS/SAD-I as above.  She does have GI support to proceed with this surgery involving the small bowel with her current diagnosis of rectal Crohn's disease.  She understands that this is a likely 3 or more hours surgery with Christianson catheter, HARI drain, etc.  She will go through the usual process for metabolic and bariatric surgery, will attend informed consent, and I will see her in the office for final evaluation prior to scheduling surgery.    Other issues include anxiety and depression, chronic cholecystitis without calculus, dyspnea on exertion, fatigue, GE reflux disease, hypertriglyceridemia, perianal Crohn's disease, insomnia, iron deficiency anemia status post iron infusions, postoperative nausea and vomiting, untreated obstructive sleep apnea, irritable bowel syndrome    Thank you Dr. Brunner for the opportunity reevaluate Ms. Baird.      Fransico Resendiz MD              Answers submitted by the patient for this visit:  Other (Submitted on 6/2/2024)  Please describe your symptoms.: None  Have you had these symptoms before?: No  How long have you been having these symptoms?: 1-4 days  Primary Reason for Visit (Submitted on 6/2/2024)  What is the primary reason for your visit?: Other

## 2024-06-04 NOTE — PROGRESS NOTES
"[facility] BARIATRIC SURGERY  [address]  [place]  [facility] [address]  [phone]      Patient  Name:  Nas Baird  :  2000      Date of Visit: 24    Chief Complaint:  weight gain; unable to maintain weight loss.   Reevaluate for possible revisional metabolic and bariatric surgery status post laparoscopic sleeve gastrectomy and hiatal hernia repair 2016 in [place]    History of Present Illness:  Nas Baird is a 24 y.o. female who presents today for reevaluation, education and consultation regarding metabolic and bariatric surgery (MBS).  Since last seen 2024 she has gained 4 pounds.  Most recent evaluation Jania MELTON PA-C dated 2024 reviewed.  She notes the patient was seen for initial intake evaluation in May 2021 by Berna MONTERO PA-C\" from that visit that the patient had sleeve gastrectomy and hiatal hernia pair in 2016 with Dr. Petty in [place] over 34 [place] bougie dilator and her highest lifetime weight was 340 pounds and prior to surgery she weighed 282 pounds with a BMI of 42 and lowest weight achieved was 215 pounds currently weighs 333 pounds has a history of GE reflux unchanged after sleeve gastrectomy and hiatal hernia pair controlled with daily PPI no dysphagia nausea vomiting or abdominal pain history of IBS with chronic loose stools and cramping questionable food sensitivity no prior gallbladder evaluation no recent upper GI imaging or endoscopies and feels she still has portion control eats healthy salads yogurts eggs drinks water exercise varies but limited by her depression.      I reviewed my evaluation dated 2022:     \"Nas Baird is a 22 y.o. female who presents today for evaluation, education and consultation regarding revisional metabolic and bariatric surgery (MBS).  Since last seen 2021 she has lost 4 pounds.  Most recent intake evaluation dated 2021 Evelyn Franco PA-C reviewed.  She notes the patient " is status post laparoscopic sleeve gastrectomy and hiatal hernia repair in October 2016 with Dr. Petty in [place] and that he did not over 30 [place] Island[place] bougie dilator and has reflux and that her initial intake evaluation was with Berna Craft PA-C on 5/14/2021 showing the patient's highest lifetime weight was 340 pounds presurgery weight 282 pounds with a BMI of 42 lowest weight 215 pounds.  She noted history of reflux unchanged after sleeve gastrectomy with hiatal hernia repair and controlled with daily PPI no dysphagia and that she has a history of IBS with chronic loose stools and cramping questionable food sensitivity no prior gallbladder evaluation no recent bariatric care and that she feels like she still has portion control and feels she eats healthy salads yogurt eggs drinks water and exercise varies but sometimes limited by her depression and that upper GI at [facility] on 6/2/2021 showed changes of vertical sleeve gastrectomy otherwise unremarkable.      The patient has had issues with morbid obesity for years and only temporary success with surgical and non-surgical methods of weight loss.  The patient is seeking revisional metabolic and bariatric surgery to help with the morbid obesity related conditions of anxiety and depression, dyspnea on exertion, fatigue, GE reflux disease, chronic cholecystitis without cholelithiasis, hypertriglyceridemia, irritable bowel syndrome, insomnia, obstructive sleep apnea.     22-year-old morbidly obese female from [place].  Her mother is with her for today's evaluation.  She tells me her GE reflux is better since her sleeve gastrectomy and hiatal hernia repair.  She says she has diarrhea already usually 3-4 times a day.  Her father has antiphospholipid syndrome which was diagnosed after he presented with a stroke.  The patient says the [facility] HIDA scan did reproduce her right upper quadrant pain.  The patient is unsure if she can take Keflex.  She says 20 mg  PPI daily controls her symptoms no dysphagia.  She takes ibuprofen as needed for menstrual cramps.  She says she is never been pregnant.  EGD on 8/16/2021 shows no obvious recurrent hiatal hernia Z-line at 39 cm no bile reflux gastritis and minimal sleeve dilatation no narrowing or twisting at the angularis.  I noted the time that she had the sleeve in [place] in October 2016 as a teenager and her original BMI was 42 and that she has nearly regained all of her weight although she has portion control.  I noted an upper GI read by Dr. Conner Cerda showed no reflux no hiatal hernia and changes of sleeve gastrectomy and then I had reviewed the images.  I did review my black-and-white EGD images.  Pathology of the antrum showed reactive antral mucosa with mild chronic gastritis and very focal activity negative for H. pylori distal esophageal biopsies showed reactive changes otherwise unremarkable.  Sound of the right upper quadrant dated 8/30/2021 unremarkable.  [facility] HIDA scan dated 11/21/2021 shows an ejection fraction of 9% and reproduction of the patient's symptoms.  Message thread from August 2021 noting that I required the patient get back on her CPAP and get a gallbladder work-up before I would see her back in the office to discuss options.....    Assessment:     Nas Baird is a 22 y.o. year old female with medically complicated obesity status post laparoscopic sleeve gastrectomy and hiatal hernia repair as a teenager in October 2016.     Revisional metabolic and bariatric surgery is deemed medically necessary given the following obesity related comorbidities including anxiety and depression, dyspnea on exertion, fatigue, GE reflux disease, chronic cholecystitis without cholelithiasis, hypertriglyceridemia, irritable bowel syndrome, insomnia, obstructive sleep apnea with current Weight: (!) 147 kg (325 lb) and Body mass index is 48.7 kg/m²..     We had a long discussion regarding the risk, benefits, and  "alternative therapies to revisional metabolic and bariatric surgical options.  I used flip charts and Internet diagrams.  I went over potential short and long-term complications and the need for lifelong vitamin, mineral supplementation and laboratory follow-up and the possibility that further surgery may be warranted for potential complications, etc.  We discussed revision to a Reynaldo-en-Y gastric bypass, a modified duodenal switch, and a formal duodenal switch.  I strongly encouraged the patient to seek second opinion(s) and to consider referral to medical weight loss.     A copy of the patient's signed informed consent is on file.        Plan: After discussion of the risks, benefits, alternative therapies as above the patient wishes to proceed with laparoscopic possible robotic assisted modified duodenal switch/sips, cholecystectomy, and EGD.  She would like a 250 cm common channel.  She will attend informed consent class and I will see her back in the office for final evaluation prior to scheduling surgery.  No primary care provider listed.\"    Jania MELTON PA-C update on 4/18/2024 she noted patient's insurance denied revision surgery and she began having significant diarrhea in 2022 and developed a hole near her anus was referred for colonoscopy and eventually seen at the [facility] was diagnosed with perianal Crohn's disease and now following with [facility] and that her symptoms have been greatly improved since starting Remicade and that her GI physician believes revision surgery could help her condition and is encouraged her to follow-up back with her office to discuss weight loss surgery as per HPI Dr. Ramos being GI at  in 3/14/2024 Cate reports initially developing symptoms of diarrhea and abdominal pain in January 2021 symptoms progressed to rectal passage of bloody mucus and then rectal pain in May 2022 she underwent EGD and colonoscopy for evaluation of her symptoms in November 2022 which showed " chronic nonspecific duodenitis no pathologic abnormality and biopsies from the right and left colon focal detached lymphoid aggregate with associated nonnecrotizing granuloma biopsy from the sigmoid colon focal acute cryptitis on colorectal biopsy findings were noted to be nonspecific she was then evaluated by colorectal surgery at [facility] where she underwent EUA in December 2022 leading to a diagnosis of perianal Crohn's disease.  Jania MELTON PA-C notes recent imaging at [facility] in June 2021 shows vertical sleeve gastrectomy otherwise unremarkable and that EGD with Dr. Resendiz in August 2021 showed no obvious recurrent hiatal hernia Z-line 39 cm no pyloric spasm or stenosis no bile reflux gastritis minimal sleeve dilatation no twisting or narrowing at the angularis and the gallbladder ultrasound in August 2021 was unremarkable and HIDA scan in November 21 showed ejection fraction that was abnormal concerning for biliary dyskinesia and then she listed my plan from my consultation on 4/26/2022.     The patient has had issues with morbid obesity for years and only temporary success with non-surgical methods of weight loss.  The patient is seeking LSG to help with the morbid obesity related conditions of anxiety and depression, chronic cholecystitis without calculus, dyspnea on exertion, fatigue, GE reflux disease, hypertriglyceridemia, perianal Crohn's disease, insomnia, iron deficiency anemia status post iron infusions, postoperative nausea and vomiting, untreated obstructive sleep apnea, irritable bowel syndrome.    24-year-old female from [facility].  She comes in today to discuss options.  As above her original sleeve gastrectomy and hiatal hernia repair were in [place] when she was age 16.  Her weight is now higher than preoperatively and close to her maximum lifetime weight, her BMI is now 50.4 consistent with super morbid obesity.  She said I did her mother's sleeve gastrectomy in April.   The patient says she already avoids corn syrup.  Her insurance is [employer] Blue [employer] which will not cover the safer and preferred ASMBS-endorsed modified duodenal switch/SIPS/SAD-I and therefore she is now interested in a formal duodenal switch.  Her tachycardia in the office today is asymptomatic.  She says she does not treat her sleep apnea.  She says she has never been pregnant.  She has done a lot of research on the duodenal switch and inquires about potentially foul odors to her stool and if Devrom really helps.  She says her Crohn's seems to be isolated to the rectum and that her GI doctor at  is supportive of her proceeding with duodenal switch.  She gets occasional crampy pain, not right upper quadrant pain, for which she takes Levsin as needed.  She attributes this pain to her Crohn's despite saying the Crohn's does not affect her intestines, just her rectum.  Her gallbladder remains intact and she previously had a decreased ejection fraction of 9% on workup when I saw her originally.  It sounds as if one of her family members had carotid disease but she denies personal or family history of bleeding or clotting disorders.      Past Medical History:   Diagnosis Date    Anxiety and depression     Chronic cholecystitis without calculus     Dyspepsia     Dyspnea on exertion     Fatigue     GERD (gastroesophageal reflux disease)     controlled on Omeprazole 20mg daily (x5 yrs).  EGD Dr. Resendiz 8/16/2021, upper GI 6/2/2021    Hypertriglyceridemia     IBS (irritable bowel syndrome)     chronic loose stools + cramping, ? food sensitivity    Insomnia     Iron deficiency anemia     s/p iron infusions    Morbid obesity     Perianal Crohn's disease     follows at [facility]; remicade    Postoperative nausea     Sleep apnea      Past Surgical History:   Procedure Laterality Date    GASTRIC SLEEVE LAPAROSCOPIC  2016    s/p LSG/HHR 10/2016 w/ Dr. Petty in [place] (34 Fr bougie)    TONSILLECTOMY  2003    WISDOM TOOTH  EXTRACTION  2017       Allergies   Allergen Reactions    Adhesive Tape Rash    Penicillins Hives     Uncertain if can tolerate Keflex.         Current Outpatient Medications:     cloNIDine (CATAPRES) 0.2 MG tablet, Take 2 tablets by mouth Daily., Disp: , Rfl:     ethynodiol-ethinyl estradiol (KELNOR,ZOVIA) 1-35 MG-MCG per tablet, Take 1 tablet by mouth Daily., Disp: , Rfl:     hydrOXYzine (ATARAX) 50 MG tablet, Take 1 tablet by mouth Daily., Disp: , Rfl:     hyoscyamine (ANASPAZ,LEVSIN) 0.125 MG tablet, Take 1 tablet by mouth Every 4 (Four) Hours As Needed for Cramping., Disp: , Rfl:     inFLIXimab (Remicade) 100 MG injection, Infuse  into a venous catheter 1 (One) Time. Every 6 weeks, Disp: , Rfl:     lamoTRIgine (LaMICtal) 200 MG tablet, Take 1 tablet by mouth Daily., Disp: , Rfl:     omeprazole (priLOSEC) 20 MG capsule, Take 1 capsule by mouth Daily., Disp: , Rfl:     QUEtiapine (SEROquel) 25 MG tablet, , Disp: , Rfl:     venlafaxine XR (EFFEXOR-XR) 150 MG 24 hr capsule, , Disp: , Rfl:     venlafaxine XR (EFFEXOR-XR) 75 MG 24 hr capsule, , Disp: , Rfl:     Social History     Socioeconomic History    Marital status: Single    Highest education level: Associate degree: occupational, technical, or vocational program   Tobacco Use    Smoking status: Never    Smokeless tobacco: Never   Vaping Use    Vaping status: Never Used   Substance and Sexual Activity    Alcohol use: Never    Drug use: Never     Family History   Problem Relation Age of Onset    Depression Mother     Anxiety disorder Mother     Diabetes Mother     Stroke Father     No Known Problems Brother     Diabetes Maternal Grandfather     Skin cancer Maternal Grandmother     Diabetes Maternal Grandmother     Hypertension Paternal Grandfather     Heart disease Paternal Grandfather     Heart attack Paternal Grandfather     Sleep apnea Paternal Grandfather     No Known Problems Paternal Grandmother        Review of Systems   Constitutional:  Positive for  fatigue and unexpected weight gain. Negative for chills, diaphoresis, fever and unexpected weight loss.   HENT:  Negative for congestion and facial swelling.    Eyes:  Negative for blurred vision, double vision and discharge.   Respiratory:  Negative for chest tightness, shortness of breath and stridor.    Cardiovascular:  Negative for chest pain, palpitations and leg swelling.   Gastrointestinal:  Positive for abdominal pain, diarrhea and GERD. Negative for blood in stool.   Endocrine: Negative for polydipsia.   Genitourinary:  Negative for hematuria.   Musculoskeletal:  Positive for arthralgias.   Skin:  Negative for color change.   Allergic/Immunologic: Negative for immunocompromised state.   Neurological:  Negative for confusion.   Psychiatric/Behavioral:  Positive for sleep disturbance. Negative for self-injury.        I have reviewed the ROS and confirm that it's accurate today.    Physical Exam:  Vital Signs:  Weight: (!) 152 kg (336 lb)   Body mass index is 50.35 kg/m².  Temp: 96.9 °F (36.1 °C)   Heart Rate: 114   BP: 138/76     Physical Exam  Vitals reviewed.   Constitutional:       Appearance: She is well-developed.   HENT:      Head: Normocephalic and atraumatic.      Nose: Nose normal.   Eyes:      Conjunctiva/sclera: Conjunctivae normal.      Pupils: Pupils are equal, round, and reactive to light.   Neck:      Thyroid: No thyromegaly.      Vascular: No carotid bruit.      Trachea: No tracheal deviation.   Cardiovascular:      Rate and Rhythm: Regular rhythm. Tachycardia present.      Heart sounds: Normal heart sounds.   Pulmonary:      Effort: Pulmonary effort is normal. No respiratory distress.      Breath sounds: Normal breath sounds.   Abdominal:      General: There is no distension.      Palpations: Abdomen is soft.      Tenderness: There is no abdominal tenderness.      Comments: Barely visible laparoscopy scars   Musculoskeletal:         General: No deformity. Normal range of motion.      Cervical  back: Normal range of motion and neck supple.   Skin:     General: Skin is warm and dry.      Findings: No rash.   Neurological:      Mental Status: She is alert and oriented to person, place, and time.      Cranial Nerves: No cranial nerve deficit.      Coordination: Coordination normal.   Psychiatric:         Behavior: Behavior normal.         Thought Content: Thought content normal.         Judgment: Judgment normal.         Patient Active Problem List   Diagnosis    Anxiety and depression    Morbid obesity    GERD (gastroesophageal reflux disease)    Sleep apnea    Hypertriglyceridemia    Insomnia    PONV (postoperative nausea and vomiting)    Fatigue    Dyspepsia    Dyspnea on exertion    IBS (irritable bowel syndrome)    Perianal Crohn's disease       Assessment:    Nas Baird is a 24 y.o. year old female with medically complicated super morbid obesity status post laparoscopic sleeve gastrectomy in [place] in May 2021  when she was 16 years old.    Revisional metabolic and bariatric surgery is deemed medically necessary given the following obesity related comorbidities including anxiety and depression, chronic cholecystitis without calculus, dyspnea on exertion, fatigue, GE reflux disease, hypertriglyceridemia, perianal Crohn's disease, insomnia, iron deficiency anemia status post iron infusions, postoperative nausea and vomiting, untreated obstructive sleep apnea, irritable bowel syndrome with current Weight: (!) 152 kg (336 lb) and Body mass index is 50.35 kg/m²..    Patient is aware that surgery is a tool, and that weight loss and improvement in comorbidities is not guaranteed but only seen in the context of appropriate use, follow up and physical activity.      I reviewed her Alfonso report which is negative.  Please see scanned records that I have reviewed and signed off on today.  All of this in addition to the patient's unique history and exam has been taken into consideration in determining their  appropriate candidacy for MBS.    Risks, benefits, alternative therapies were discussed to laparoscopic possible robotic assisted revision of her sleeve gastrectomy to a duodenal switch.  The most common short term complics in addition to cardiopulm risk, VTE, bleeding, infection, etc is leak or obstruction at the anastomosis, which could have potentially serious and even fatal consequences and require further surgery(s).  The patient understands that they will have more frequent BM's, will be malodorous, and that at increased risk for vitamin deficiencies, sara the fat soluble vitamins, and that this can be serious and irreversible - still wishes to proceed, says they will be compliant w f/u and vit/supplment recommendations.  Aware that may need an elongation procedure in the future if diarrhea and/or vitamin issues not controlled.      The patient is aware if a hiatal hernia is encountered, it likely will be repaired.  R/B/A Rx to hiatal hernia repair were discussed as outlined in our long consent form.  Briefly risks in addition to those for duodenal switch include recurrent hernia, GIUSEPPE, dysphagia, esophageal injury, pneumothorax, injury to the vagus nerves, injury to the thoracic duct, aorta or vena cava.    R/b/a rx discussed including but not limited to bleeding, infection, bile leak, bile duct injury, bowel injury, pulm complications, venothromboembolic events, death etc and wishes to proceed with concomitant lap rosalba.  Aware may not alleviate symptoms and further work up may be warranted.       Plan:   After discussion of the revisional metabolic and bariatric surgical options and the risks, benefits, and alternative therapies as above she wishes to proceed with laparoscopic robotic assisted revision of her previous sleeve gastrectomy to a formal duodenal switch, cholecystectomy, and EGD.  Once again her [employer] Blue Shield insurance will not cover  the safer and preferred ASMBS-endorsed modified  duodenal switch/SIPS/SAD-I as above.  She does have GI support to proceed with this surgery involving the small bowel with her current diagnosis of rectal Crohn's disease.  She understands that this is a likely 3 or more hours surgery with Christianson catheter, HARI drain, etc.  She will go through the usual process for metabolic and bariatric surgery, will attend informed consent, and I will see her in the office for final evaluation prior to scheduling surgery.    Other issues include anxiety and depression, chronic cholecystitis without calculus, dyspnea on exertion, fatigue, GE reflux disease, hypertriglyceridemia, perianal Crohn's disease, insomnia, iron deficiency anemia status post iron infusions, postoperative nausea and vomiting, untreated obstructive sleep apnea, irritable bowel syndrome    Thank you Dr. Brunner for the opportunity reevaluate Ms. Baird.      Fransico Resendiz MD              Answers submitted by the patient for this visit:  Other (Submitted on 6/2/2024)  Please describe your symptoms.: None  Have you had these symptoms before?: No  How long have you been having these symptoms?: 1-4 days  Primary Reason for Visit (Submitted on 6/2/2024)  What is the primary reason for your visit?: Other

## 2024-07-03 ENCOUNTER — OFFICE VISIT (OUTPATIENT)
Dept: BEHAVIORAL HEALTH | Facility: CLINIC | Age: 24
End: 2024-07-03
Payer: COMMERCIAL

## 2024-07-03 DIAGNOSIS — R45.4 IRRITABILITY: ICD-10-CM

## 2024-07-03 DIAGNOSIS — F32.A ANXIETY AND DEPRESSION: ICD-10-CM

## 2024-07-03 DIAGNOSIS — F41.9 ANXIETY AND DEPRESSION: ICD-10-CM

## 2024-07-03 DIAGNOSIS — E66.01 MORBID OBESITY WITH BMI OF 50.0-59.9, ADULT: Primary | ICD-10-CM

## 2024-07-03 DIAGNOSIS — Z71.89 ENCOUNTER FOR PSYCHOLOGICAL ASSESSMENT PRIOR TO BARIATRIC SURGERY: ICD-10-CM

## 2024-07-03 DIAGNOSIS — G89.29 OTHER CHRONIC PAIN: ICD-10-CM

## 2024-07-03 PROCEDURE — 90791 PSYCH DIAGNOSTIC EVALUATION: CPT | Performed by: PSYCHOLOGIST

## 2024-07-04 NOTE — PROGRESS NOTES
PROGRESS NOTE    Data:    Nas Baird is a 24 y.o. female who met with the undersigned for a scheduled psychological evaluation from 3:00 - 3:45 pm.      Clinical Maneuvering/Intervention:      Chief complaint and history of presenting illness/Problems: struggling with obesity for several years. Despite trying different weight loss plans and diets, the pt reported being unsuccessful in losing weight. She did have weight loss surgery several years ago (2016), but subsequently struggled to keep weight off and over time gained back the weight. She is seeking a second weight loss surgery. A psychological evaluation was conducted in order to assess past and current level of functioning. Areas assessed included, but were not limited to: perception of social support, perception of ability to face and deal with challenges in life (positive functioning), anxiety symptoms, depressive symptoms, perspective on beliefs/belief system, coping skills for stress, intelligence level, addiction issues, etc. Therapeutic rapport was established. Interventions conducted today were geared towards assessing the pt's readiness for weight loss surgery and identifying and psychological contraindications for undergoing such a major life change. Social support was deemed strong (specific to weight loss surgery/weight loss in this manner and in a general sense): mother, father, friends, and doctors.    Current psychological struggles were described as low to moderate, but included: depression and anxiety, often related to being overweight. She talked about the frustrations and at times, anger when she has been faced with needing a second weight loss surgery (e.g., and others not needing it), not getting calls back from the office, and feeling held back in life by having different health problems. She could also speak to understanding herself and how these feeling come about as well as how, even though life has been unexpectedly difficult, she  will still push through and do whatever it takes for her to be healthier in life. Coping skills for distress and related to undergoing a major life change such as weight loss surgery/weight loss were deemed strong: perseverance, asking for help, taking action to get needs met, taking time to self-reflect to understand herself, finds humor in things, makes a point to do quality work, tends to be a responsible person, maintains quality relationships with others, and makes a point to learn what will help her be successful with weight loss surgery. The pt endorsed having characteristics of readiness to undergo major life changes inherent in the journey of weight loss surgery. She could speak to having 'suffered enough,' and the decision to have a second weight loss surgery is one she feels good about. The pt expressed gratitude for today's visit.     Past Family and Social History:      History of family mental health problems: mother (anxiety, depression)    Psychosocial history: treatment of psychiatric care in the past (N/A), alcohol/substance abuse treatment in the past (N/A) , alcohol/substance abuse problems (N/A), inpatient psychiatric care (N/A).    Mental Status Exam (MSE):  Hygiene:  good  Dress: normal  Attitude:  cooperative and proactive  Motor Activity: normal  Speech: normal  Mood:  determined  Affect:  congruent  Thought Processes: normal  Thought Content:  normal  Suicidal Thoughts:  not endorsed  Homicidal Thoughts:  not endorsed  Crisis Safety Plan: not needed   Hallucinations:  none      Patient's Support Network Includes:  family, friends      Progress toward goal: there is evidence to suggest that she is taking measures to improve the quality of her life including seeking weight loss surgery      Functional Status: moderate to high      Prognosis: good with weight loss surgery    Evaluation, Diagnoses, and Ability/Capacity to Respond to Treatment:      The pt presented to be struggling with  depression and anxiety. Being overweight (BMI = 50.3, morbid obesity)  and having other health problems (e.g., Crohn's disease) seem to cause the majority of her emotional distress. Anger is present and seems to be a manifestation of anxiety and depression. Results of MSE demonstrated a functional status of moderate to high. Strengths: perseverance, taking action to get needs met, belief in self that she will be successful with weight loss surgery, etc (see detailed list of coping skills above). Needed for growth (CPT code requirement for Weaknesses): weight loss.       From a psychological standpoint, the pt presents as a good candidate for bariatric surgery. She is motivated for the surgery, has showed readiness for the lifestyle change in terms of starting to adjust her eating habits, and seems to have appropriate expectations of how to prepare and how to live after surgery in order to lose weight successfully.    Treatment Plan:      Short term goals: Start improving her health by following up with her bariatric surgeon in order to receive weight loss surgery as soon as feasible/appropriate and demonstrate success with compliance to adhering to the recommended diet. Long term goals: reach a healthy weight and experience alleviation of depression/anxiety via taking control over her health.    Heena Rey, PhD, LP

## 2024-07-05 LAB
ALBUMIN SERPL-MCNC: 4.1 G/DL (ref 4–5)
ALP SERPL-CCNC: 118 IU/L (ref 44–121)
ALT SERPL-CCNC: 23 IU/L (ref 0–32)
AST SERPL-CCNC: 24 IU/L (ref 0–40)
BASOPHILS # BLD AUTO: 0 X10E3/UL (ref 0–0.2)
BASOPHILS NFR BLD AUTO: 0 %
BILIRUB SERPL-MCNC: 0.2 MG/DL (ref 0–1.2)
BUN SERPL-MCNC: 8 MG/DL (ref 6–20)
BUN/CREAT SERPL: 12 (ref 9–23)
CALCIUM SERPL-MCNC: 9.7 MG/DL (ref 8.7–10.2)
CHLORIDE SERPL-SCNC: 101 MMOL/L (ref 96–106)
CHOLEST SERPL-MCNC: 236 MG/DL (ref 100–199)
CO2 SERPL-SCNC: 22 MMOL/L (ref 20–29)
CREAT SERPL-MCNC: 0.66 MG/DL (ref 0.57–1)
EGFRCR SERPLBLD CKD-EPI 2021: 125 ML/MIN/1.73
EOSINOPHIL # BLD AUTO: 0.1 X10E3/UL (ref 0–0.4)
EOSINOPHIL NFR BLD AUTO: 1 %
ERYTHROCYTE [DISTWIDTH] IN BLOOD BY AUTOMATED COUNT: 13.6 % (ref 11.7–15.4)
GLOBULIN SER CALC-MCNC: 3.3 G/DL (ref 1.5–4.5)
GLUCOSE SERPL-MCNC: 93 MG/DL (ref 70–99)
HBA1C MFR BLD: 5.9 % (ref 4.8–5.6)
HCT VFR BLD AUTO: 42.2 % (ref 34–46.6)
HDLC SERPL-MCNC: 59 MG/DL
HGB BLD-MCNC: 13.8 G/DL (ref 11.1–15.9)
IMM GRANULOCYTES # BLD AUTO: 0.1 X10E3/UL (ref 0–0.1)
IMM GRANULOCYTES NFR BLD AUTO: 1 %
LDLC SERPL CALC-MCNC: 130 MG/DL (ref 0–99)
LYMPHOCYTES # BLD AUTO: 3.3 X10E3/UL (ref 0.7–3.1)
LYMPHOCYTES NFR BLD AUTO: 32 %
MCH RBC QN AUTO: 26.7 PG (ref 26.6–33)
MCHC RBC AUTO-ENTMCNC: 32.7 G/DL (ref 31.5–35.7)
MCV RBC AUTO: 82 FL (ref 79–97)
MONOCYTES # BLD AUTO: 0.6 X10E3/UL (ref 0.1–0.9)
MONOCYTES NFR BLD AUTO: 6 %
NEUTROPHILS # BLD AUTO: 6.3 X10E3/UL (ref 1.4–7)
NEUTROPHILS NFR BLD AUTO: 60 %
PLATELET # BLD AUTO: 471 X10E3/UL (ref 150–450)
POTASSIUM SERPL-SCNC: 4.5 MMOL/L (ref 3.5–5.2)
PROT SERPL-MCNC: 7.4 G/DL (ref 6–8.5)
RBC # BLD AUTO: 5.17 X10E6/UL (ref 3.77–5.28)
SODIUM SERPL-SCNC: 138 MMOL/L (ref 134–144)
TRIGL SERPL-MCNC: 267 MG/DL (ref 0–149)
TSH SERPL DL<=0.005 MIU/L-ACNC: 1.32 UIU/ML (ref 0.45–4.5)
UREA BREATH TEST QL: NEGATIVE
VLDLC SERPL CALC-MCNC: 47 MG/DL (ref 5–40)
WBC # BLD AUTO: 10.3 X10E3/UL (ref 3.4–10.8)

## 2024-09-11 ENCOUNTER — TRANSCRIBE ORDERS (OUTPATIENT)
Dept: CALL CENTER | Age: 24
End: 2024-09-11
Payer: COMMERCIAL

## 2024-09-11 DIAGNOSIS — K50.10: Primary | ICD-10-CM

## 2024-10-01 NOTE — TELEPHONE ENCOUNTER
REFERRAL INFORMATION:  Referring Provider:  Mignon Díaz   Referring Clinic:  [facility]ties clinic at [facility].   Reason for Visit/Diagnosis: K50.10 (ICD-10-CM) - Crohn's disease of perianal region, without complications (H)      FUTURE VISIT INFORMATION:  Appointment Date: 11/6/24  Appointment Time:      NOTES STATUS DETAILS   OFFICE NOTE from Referring Provider Care Everywhere 8/30/24 + more in EPIC   OFFICE NOTE from Other Specialist Care Everywhere 1/4/24-Dr. Potter-Gen Surgery-[facility]    12/20/22-Dr. Yap-Colon and Rectal Surgery-[facility]    10/12/22-Dr. Manning- [facility]    4/26/22 Consult-Dr. Walden-  [facility] DISCHARGE SUMMARY/  ED VISITS N/A    OPERATIVE REPORT Care Everywhere/Internal 12/21/22-KS EXAM ANORECTAL SURG W ANES   KS BX ANORECTAL WALL ANAL APPR   EXAMINATION UNDER ANESTHESIA ANORECTAL proceed as indicated - Dr. Yap-[facility]-case # [id number]     10/12/16-Laparoscopic Sleeve Gastrectomy, hiatal hernia repair -Dr. Washington-   MEDICATION LIST Internal         ENDOSCOPY  Care Everywhere 5/27/15-[facility]   COLONOSCOPY Care Everywhere 11/1/22-[facility]   ERCP N/A    EUS N/A    STOOL TESTING N/A    PERTINENT LABS CE    PATHOLOGY REPORTS (RELATED) Care Everywhere 12/21/22-Case #[id number]   10/12/16-Lap Sleeve   IMAGING (CT, MRI, EGD, MRCP, Small Bowel Follow Through/SBT, MR/CT Enterography) Care Everywhere 10/20/23, 10/20/22- CT Enterography- Healthcare       Records Requested     October 1, 2024 1:39 PM  [facility]   Facility  [facility]-Tracking # [id number] to Jessie to upload-[place]ates 10/8/24   Outcome Requested images

## 2024-11-06 ENCOUNTER — ENROLLMENT (OUTPATIENT)
Dept: HOME HEALTH SERVICES | Facility: HOME HEALTH | Age: 24
End: 2024-11-06
Payer: COMMERCIAL

## 2024-11-06 ENCOUNTER — PRE VISIT (OUTPATIENT)
Dept: GASTROENTEROLOGY | Facility: CLINIC | Age: 24
End: 2024-11-06

## 2024-11-06 ENCOUNTER — MYC MEDICAL ADVICE (OUTPATIENT)
Dept: GASTROENTEROLOGY | Facility: CLINIC | Age: 24
End: 2024-11-06

## 2024-11-06 ENCOUNTER — OFFICE VISIT (OUTPATIENT)
Dept: GASTROENTEROLOGY | Facility: CLINIC | Age: 24
End: 2024-11-06

## 2024-11-06 VITALS
WEIGHT: 293 LBS | HEART RATE: 107 BPM | HEIGHT: 68 IN | OXYGEN SATURATION: 98 % | SYSTOLIC BLOOD PRESSURE: 142 MMHG | BODY MASS INDEX: 44.41 KG/M2 | DIASTOLIC BLOOD PRESSURE: 94 MMHG

## 2024-11-06 DIAGNOSIS — K50.10: ICD-10-CM

## 2024-11-06 DIAGNOSIS — E66.01 OBESITY, CLASS III, BMI 40-49.9 (MORBID OBESITY) (H): ICD-10-CM

## 2024-11-06 DIAGNOSIS — K21.9 ESOPHAGEAL REFLUX: ICD-10-CM

## 2024-11-06 DIAGNOSIS — K50.10 CROHN'S DISEASE OF PERIANAL REGION (H): Primary | ICD-10-CM

## 2024-11-06 PROCEDURE — 99204 OFFICE O/P NEW MOD 45 MIN: CPT | Performed by: INTERNAL MEDICINE

## 2024-11-06 RX ORDER — EPINEPHRINE 1 MG/ML
0.3 INJECTION, SOLUTION, CONCENTRATE INTRAVENOUS EVERY 5 MIN PRN
OUTPATIENT
Start: 2024-11-06

## 2024-11-06 RX ORDER — DIPHENHYDRAMINE HCL 25 MG
25 CAPSULE ORAL ONCE
Start: 2024-11-06 | End: 2024-11-06

## 2024-11-06 RX ORDER — MEPERIDINE HYDROCHLORIDE 25 MG/ML
25 INJECTION INTRAMUSCULAR; INTRAVENOUS; SUBCUTANEOUS
OUTPATIENT
Start: 2024-11-06

## 2024-11-06 RX ORDER — HEPARIN SODIUM,PORCINE 10 UNIT/ML
5-20 VIAL (ML) INTRAVENOUS DAILY PRN
OUTPATIENT
Start: 2024-11-06

## 2024-11-06 RX ORDER — DIPHENHYDRAMINE HYDROCHLORIDE 50 MG/ML
50 INJECTION INTRAMUSCULAR; INTRAVENOUS
Start: 2024-11-06

## 2024-11-06 RX ORDER — METHYLPREDNISOLONE SODIUM SUCCINATE 40 MG/ML
40 INJECTION INTRAMUSCULAR; INTRAVENOUS
Start: 2024-11-06

## 2024-11-06 RX ORDER — HEPARIN SODIUM (PORCINE) LOCK FLUSH IV SOLN 100 UNIT/ML 100 UNIT/ML
5 SOLUTION INTRAVENOUS
OUTPATIENT
Start: 2024-11-06

## 2024-11-06 RX ORDER — ACETAMINOPHEN 325 MG/1
650 TABLET ORAL ONCE
Start: 2024-11-06 | End: 2024-11-06

## 2024-11-06 RX ORDER — ALBUTEROL SULFATE 0.83 MG/ML
2.5 SOLUTION RESPIRATORY (INHALATION)
OUTPATIENT
Start: 2024-11-06

## 2024-11-06 RX ORDER — ALBUTEROL SULFATE 90 UG/1
1-2 INHALANT RESPIRATORY (INHALATION)
Start: 2024-11-06

## 2024-11-06 RX ORDER — DIPHENHYDRAMINE HYDROCHLORIDE 50 MG/ML
25 INJECTION INTRAMUSCULAR; INTRAVENOUS
Start: 2024-11-06

## 2024-11-06 ASSESSMENT — PATIENT HEALTH QUESTIONNAIRE - PHQ9: SUM OF ALL RESPONSES TO PHQ QUESTIONS 1-9: 24

## 2024-11-06 ASSESSMENT — PAIN SCALES - GENERAL: PAINLEVEL_OUTOF10: NO PAIN (0)

## 2024-11-06 NOTE — NURSING NOTE
Depression Response    Patient completed the PHQ-9 assessment for depression and scored >9? Yes  Question 9 on the PHQ-9 was positive for suicidality? No  Does patient have current mental health provider? Yes    Is this a virtual visit? No    I personally notified the following: clinic nurse

## 2024-11-06 NOTE — PATIENT INSTRUCTIONS
It was a pleasure meeting with you today and discussing your healthcare plan. Below is a summary of what we covered:    -- Continue with infliximab - move dosing to [facility] home infusion    -- Infliximab predict level    -- MRI and endoscopy through [place]    -- Colorectal surgery appointment for non-healing agustin-anal fissure.     Follow-up in 4 months.       Please see below for any additional questions and scheduling guidelines.    Sign up for 2[employer]: [employer] patient portal serves as a secure platform for accessing your medical records from the [facility]. Additionally, [employer] facilitates easy, timely, and secure messaging with your care team. If you have not signed up, you may do so by using the provided code or calling [phone].    Coordinating your care after your visit:  There are multiple options for scheduling your follow-up care based on your provider's recommendation.    How do I schedule a follow-up clinic appointment:   After your appointment, you may receive scheduling assistance with the Clinic Coordinators by having a seat in the waiting room and a Clinic Coordinator will call you up to schedule.  Virtual visits or after you leave the clinic:  Your provider has placed a follow-up order in the [employer] portal for scheduling your return appointment. A member of the scheduling team will contact you to schedule.  [employer]Capt Scheduling: Timely scheduling through [employer] is advised to ensure appointment availability.   Call to schedule: You may schedule your follow-up appointment(s) by calling [phone], option 1.    How do I schedule my endoscopy or colonoscopy procedure:  If a procedure, such as a colonoscopy or upper endoscopy was ordered by your provider, the scheduling team will contact you to schedule this procedure. Or you may choose to call to schedule at   [phone], option 2.  Please allow 20-30 minutes when scheduling a procedure.    How do I get my blood work done? To get  your blood work done, you need to schedule a lab appointment at an [facility]. There are multiple ways to schedule:   At the clinic: The Clinic Coordinator you meet after your visit can help you schedule a lab appointment.   Vusay scheduling: [facility]ay offers online lab scheduling at all [facility] laboratory locations.   Call to schedule: You can call [phone] to schedule your lab appointment.    How do I schedule my imaging study: To schedule imaging studies, such as CT scans, ultrasounds, MRIs, or X-rays, contact Imaging Services at [phone].    How do I schedule a referral to another doctor: If your provider recommended a referral to another specialist(s), the referral order was placed by your provider. You will receive a phone call to schedule this referral, or you may choose to call the number attached to the referral to self-schedule.    For Post-Visit Question(s):  For any inquiries following today's visit:  Please utilize [employer]ay messaging and allow 48 hours for reply or contact the Call Center during normal business hours at [phone], option 3.  For Emergent After-hours questions, contact the On-Call GI Fellow through the [facility]  at [phone].  In addition, you may contact your Nurse directly using the provided contact information.    Test Results:  Test results will be accessible via [employer] in compliance with the 21st Century Cures Act. This means that your results will be available to you at the same time as your provider. Often you may see your results before your provider does. Results are reviewed by staff within two weeks with communication follow-up. Results may be released in the patient portal prior to your care team review.    Prescription Refill(s):  Medication prescribed by your provider will be addressed during your visit. For future refills, please coordinate with your pharmacy. If you have not had a recent clinic visit or  routine labs, for your safety, your provider may not be able to refill your prescription.

## 2024-11-06 NOTE — TELEPHONE ENCOUNTER
Per Dr. Briceño:    -- Infliximab infusion plan initiated: 10mg/kg every 6 weeks  -- Referral sent to [facility]  -- Colorectal surgery referral placed for non-healing agustin-anal fissure  -- Bariatric referral placed for weight loss, BMI 51.41, 153.4 kg (338 lb 1.6 oz)  -- Standing lab orders placed  -- Cricket TB gold order placed  -- PredictrPK IFX maintenance ordered  -- MTM referral sent  -- follow up in clinic in 4 months

## 2024-11-06 NOTE — PROGRESS NOTES
IBD CLINIC VISIT    CC/REFERRING MD:  Referred Self  REASON FOR CONSULTATION: Crohn's disease    ASSESSMENT/PLAN:  24 year old with Crohn's disease with predominantly agustin-anal manifestations. Also history of Hydradenitis suppurativa.     1. Crohn's disease:   Current medication:    - Infliximab 10mg/kg q6  Current clinical disease activity: HBI: 3  Last endoscopic disease activity: No endoscopic inflammation  Last radiologic disease assessment: 10/20/2023: No convincing active IBD.     Diagnosis of Crohn's disease is probable, although not definite. She has isolated agustin-anal ulceration in the setting of morbid obesity. I recommend restaging (which she will get at [facility]) with colonoscopy and MRE. Also recommend colorectal surgery evaluation for agustin-anal disease. In my opinion, I would hold off starting azathioprine until restaging work-up.     -- Colonoscopy (at [facility])  -- MRE (At [facility])  -- Colorectal surgery referral - consider steroid injection or topical tacrolimus to rectal ulcer.   -- Continue infliximab for now at current dose - [facility] referral.   -- Predict-IFX    2. Obesity: BMI 51.4, Referral to medical weight management placed. She is also going to see [facility] as she already has an appointment, but would like to ultimately consolidate her care at the .     IBD dysplasia surveillance: No clear colonic disease. Plan to restate 8 years after diagnosis.     Return to clinic in 4 months    Thank you for this consultation.  It was a pleasure to participate in the care of this patient; please contact us with any further questions.  A total of 49 minutes on the date of the encounter, 11/6/2024, was spent with this patient in the following care activities: Chart review, patient care and documentation.      This note was created with voice recognition software, and while reviewed for accuracy, typos may remain.     Tommy Briceño MD MS  Associate Professor of Medicine  Division of Gastroenterology, Hepatology and  "Nutrition  [facility]  Pager: [phone]       IBD HISTORY  Age at diagnosis: 16  Endoscopic extent of disease: None reported.  Histologic Extent of disease:    2022: colon pathology with sigmoid biopsy with \"focal detached lymphoid aggregate with associated non-necrotizing granuloma). Rectum with focal acute cryptitis.   Disease phenotype: isolated perianal  Agustin-anal disease: Yes  Prior IBD surgeries: None  Prior IBD Medications: None    DRUG MONITORING  TPMT enzyme activity: 35.9 (1/13/2023)    6-TGN/6-MMPN levels:    Biologic concentration:  -- IFX: 39 trough on 10 mg/kg q6    HPI:   Brief IBD diagnosis history:   -- Jan 2021: Symptoms of diarrhea, abdominal pain, rectal pain, passage of blood and mucus per rectum.   -- Nov 2022: EGD/Colonoscopy - endoscopically normal. Sigmoid with detached lymphoid aggregate with granuloma. Focal acute cryptitis in the rectum.   -- Dec 2022: EUA at [facility] - large sacral perianal ulcer. Diagnosed with perianal Crohn's diagnosis   -- 2023 - started on infliximab    On infliximab she reported a mild improvement in diarrhea. Although infliximab was started for agustin-anal disease (not diarrhea).     Currently, she is recently moved back to [place]. She was seen at [facility] recently and instructed to have a local GI provider. Was recommended to start azathioprine by [facility] team.     Last infliximab infusion was September. Due in 1 week.     Her main issue is agustin-anal disease, which is still bothersome to her. Although not having agustin-anal abscess.     Currently, on choelstipol having 3 soft stools per day. Will have an occasional liquid or water stool every other day. Does have some abdominal pain, mild.     HBI:  Overall patient well being (prior day): 1 (Slightly below par)  Abdominal pain (prior day): 1 (Mild)  Number of liquid or soft stools (prior day): 1 (1 point per stool)  Abdominal mass on exam: 0 (None)  Complications (1 point for each):   Anal fissue    Remission <5  Mild " "activity 5-7  Moderate activity 8-16  Severe > 16     Extra intestinal manifestations of IBD:  No uveitis/episcleritis  No aphthous ulcers   No arthritis   No erythema nodosum/pyoderma gangrenosum.     sIBDQ:  IBDQ Score Date IBDQ - Total Score  (Higher score better)   11/6/2024   8:04 AM 20      Pertinent labs / imaging:   OSH EGD/Colonoscopy Path (Nov 2022)  A. DUODENUM, BIOPSY:  - CHRONIC INFLAMMATION WITH GASTRIC SURFACE CELL METAPLASIA; FEATURES OF CHRONIC NONSPECIFIC DUODENITIS    B. COLON, RIGHT, BIOPSY:  - NO PATHOLOGIC ABNORMALITY; NO EVIDENCE OF MICROSCOPIC COLITIS    C. COLON, LEFT, BIOPSY:  - NO PATHOLOGIC ABNORMALITY; NO EVIDENCE OF MICROSCOPIC COLITIS    D. COLON, SIGMOID, BIOPSY:  - NO SPECIFIC PATHOLOGIC ABNORMALITY; NO EVIDENCE OF MICROSCOPIC COLITIS  - FOCAL DETACHED LYMPHOID AGGREGATE WITH ASSOCIATED NON-NECROTIZING GRANULOMA (SEE COMMENT)    E. RECTUM, BIOPSY:  - FOCAL ACUTE CRYPTITIS (SEE COMMENT)     ROS:    Constitutional, HEENT, cardiovascular, pulmonary, GI, , musculoskeletal, neuro, skin, endocrine and psych systems are negative, except as otherwise noted.    PHYSICAL EXAMINATION:  Constitutional: aaox3, cooperative, pleasant, not dyspneic/diaphoretic, no acute distress  Vitals reviewed: BP (!) 142/94   Pulse 107   Ht 1.727 m (5' 8\")   Wt (!) 153.4 kg (338 lb 1.6 oz)   SpO2 98%   BMI 51.41 kg/m    Wt:   Wt Readings from Last 2 Encounters:   11/06/24 (!) 153.4 kg (338 lb 1.6 oz)   01/06/20 147.7 kg (325 lb 9.9 oz)      Eyes: Sclera anicteric/injected  Ears/nose/mouth/throat: Normal oropharynx without ulcers or exudate, mucus membranes moist, hearing intact  Neck: supple, thyroid normal size  CV: No edema  Respiratory: Unlabored breathing  Lymph: No axillary, submandibular, supraclavicular or inguinal lymphadenopathy  Abd: Obese, Nondistended, +bs, no hepatosplenomegaly, nontender, no peritoneal signs  Skin: warm, perfused, no jaundice  Psych: Normal affect  MSK: Normal " gait    PERTINENT PAST MEDICAL HISTORY:  Past Medical History:   Diagnosis Date    Adjustment disorder     mixed anxiety and depression    Eating disorder     GERD (gastroesophageal reflux disease)     Hypertriglyceridemia     Insomnia     Menorrhagia     Obesity     CHUCHO (obstructive sleep apnea)     PONV (postoperative nausea and vomiting)     Social anxiety disorder        PREVIOUS SURGERIES:  Past Surgical History:   Procedure Laterality Date    GI SURGERY      EGD    LAPAROSCOPIC GASTRIC SLEEVE N/A 10/12/2016    Procedure: LAPAROSCOPIC GASTRIC SLEEVE;  Surgeon: Dejon Washington MD;  Location:  OR    TONSILLECTOMY & ADENOIDECTOMY      ZC ORAL SURGERY PROCEDURE         ALLERGIES:     Allergies   Allergen Reactions    Penicillins Hives    Pineapple      Throat itching       PERTINENT MEDICATIONS:    Current Outpatient Medications:     CRANBERRY EXTRACT PO, Take 500 mg by mouth , Disp: , Rfl:     ethynodiol-ethinyl estradiol (KELNOR 1/35) 1-35 MG-MCG tablet, , Disp: , Rfl:     ferrous fumarate 65 mg, Jamul. FE,-Vitamin C 125 mg (VITRON-C)  MG TABS tablet, Take 1 tablet by mouth 2 times daily, Disp: 120 tablet, Rfl: 3    HYDROXYZINE HCL PO, Take 50 mg by mouth , Disp: , Rfl:     lamoTRIgine (LAMICTAL) 200 MG tablet, Take 1 tablet (200 mg) by mouth daily, Disp:  , Rfl:     melatonin 5 MG tablet, Take 10 mg by mouth nightly as needed for sleep , Disp: , Rfl:     omeprazole (PRILOSEC) 20 MG capsule, , Disp: , Rfl: 1    Venlafaxine HCl (EFFEXOR XR PO), Take 200 mg by mouth daily , Disp: , Rfl:     prazosin (MINIPRESS) 5 MG capsule, Take 1 capsule (5 mg) by mouth At Bedtime (Patient not taking: Reported on 11/6/2024), Disp: , Rfl:     SOCIAL HISTORY:  Social History     Socioeconomic History    Marital status: Single     Spouse name: Not on file    Number of children: Not on file    Years of education: Not on file    Highest education level: Not on file   Occupational History    Not on file   Tobacco Use     Smoking status: Never    Smokeless tobacco: Never   Substance and Sexual Activity    Alcohol use: No    Drug use: No    Sexual activity: Not on file   Other Topics Concern    Not on file   Social History Narrative    Not on file     Social Drivers of Health     Financial Resource Strain: Patient Declined (12/14/2022)    Received from [facility]    Overall Financial Resource Strain (CARDIA)     Difficulty of Paying Living Expenses: Patient declined   Food Insecurity: No Food Insecurity (10/8/2024)    Received from [facility]    Hunger Vital Sign     Worried About Running Out of Food in the Last Year: Never true     Ran Out of Food in the Last Year: Never true   Transportation Needs: No Transportation Needs (10/8/2024)    Received from [facility]    PRAPARE - Transportation     Lack of Transportation (Medical): No     Lack of Transportation (Non-Medical): No   Physical Activity: Inactive (10/8/2024)    Received from [facility]    Exercise Vital Sign     Days of Exercise per Week: 0 days     Minutes of Exercise per Session: 0 min   Stress: Stress Concern Present (12/14/2022)    Received from [facility]    [employer] - Occupational Stress Questionnaire     Feeling of Stress : Very much   Social Connections: Unknown (10/9/2023)    Received from [facility]    Family and Community Support     Help with Day-to-Day Activities: Not on file     Lonely or Isolated: Not on file   Interpersonal Safety: Unknown (10/9/2023)    Received from [facility]    Abuse Screen     Unsafe at Home or Work/School: Not on file     Feels Threatened by Someone?: Not on file     Does Anyone Keep You from Contacting Others or Doint Things Outside the Home?: Not on file     Physical Sign of Abuse Present: Not on file   Housing Stability: Low Risk  (10/8/2024)    Received from [facility]    Housing Stability     What is your living situation today?: I have a  steady place to live       FAMILY HISTORY:  No family history on file.  No family history of IBD, celiac, or colon cancer  + Family history of anxiety     Past/family/social history reviewed and no changes

## 2024-11-06 NOTE — TELEPHONE ENCOUNTER
Placed call to patient to discuss plan. Patient has not read [address] message. No answer, left voicemail.

## 2024-11-06 NOTE — LETTER
11/6/2024      Bi Alves  [address] [place] [address]      Dear Colleague,    Thank you for referring your patient, Bi Alves, to the [facility] [place]. Please see a copy of my visit note below.    IBD CLINIC VISIT    CC/REFERRING MD:  Referred Self  REASON FOR CONSULTATION: Crohn's disease    ASSESSMENT/PLAN:  24 year old with Crohn's disease with predominantly agustin-anal manifestations. Also history of Hydradenitis suppurativa.     1. Crohn's disease:   Current medication:    - Infliximab 10mg/kg q6  Current clinical disease activity: HBI: 3  Last endoscopic disease activity: No endoscopic inflammation  Last radiologic disease assessment: 10/20/2023: No convincing active IBD.     Diagnosis of Crohn's disease is probable, although not definite. She has isolated agustin-anal ulceration in the setting of morbid obesity. I recommend restaging (which she will get at [facility]) with colonoscopy and MRE. Also recommend colorectal surgery evaluation for agustin-anal disease. In my opinion, I would hold off starting azathioprine until restaging work-up.     -- Colonoscopy (at [facility])  -- MRE (At [facility])  -- Colorectal surgery referral - consider steroid injection or topical tacrolimus to rectal ulcer.   -- Continue infliximab for now at current dose - [facility] referral.   -- Predict-IFX    2. Obesity: BMI 51.4, Referral to medical weight management placed. She is also going to see [facility] as she already has an appointment, but would like to ultimately consolidate her care at the .     IBD dysplasia surveillance: No clear colonic disease. Plan to restate 8 years after diagnosis.     Return to clinic in 4 months    Thank you for this consultation.  It was a pleasure to participate in the care of this patient; please contact us with any further questions.  A total of 49 minutes on the date of the encounter, 11/6/2024, was spent with this patient in the following care activities: Chart  "review, patient care and documentation.      This note was created with voice recognition software, and while reviewed for accuracy, typos may remain.     Tommy Briceño MD MS  Associate Professor of Medicine  Division of Gastroenterology, Hepatology and Nutrition  [facility]  Pager: [phone]       IBD HISTORY  Age at diagnosis: 16  Endoscopic extent of disease: None reported.  Histologic Extent of disease:    2022: colon pathology with sigmoid biopsy with \"focal detached lymphoid aggregate with associated non-necrotizing granuloma). Rectum with focal acute cryptitis.   Disease phenotype: isolated perianal  Agustin-anal disease: Yes  Prior IBD surgeries: None  Prior IBD Medications: None    DRUG MONITORING  TPMT enzyme activity: 35.9 (1/13/2023)    6-TGN/6-MMPN levels:    Biologic concentration:  -- IFX: 39 trough on 10 mg/kg q6    HPI:   Brief IBD diagnosis history:   -- Jan 2021: Symptoms of diarrhea, abdominal pain, rectal pain, passage of blood and mucus per rectum.   -- Nov 2022: EGD/Colonoscopy - endoscopically normal. Sigmoid with detached lymphoid aggregate with granuloma. Focal acute cryptitis in the rectum.   -- Dec 2022: EUA at [facility] - large sacral perianal ulcer. Diagnosed with perianal Crohn's diagnosis   -- 2023 - started on infliximab    On infliximab she reported a mild improvement in diarrhea. Although infliximab was started for agustin-anal disease (not diarrhea).     Currently, she is recently moved back to [place]. She was seen at [facility] recently and instructed to have a local GI provider. Was recommended to start azathioprine by [facility] team.     Last infliximab infusion was September. Due in 1 week.     Her main issue is agustin-anal disease, which is still bothersome to her. Although not having agustin-anal abscess.     Currently, on choelstipol having 3 soft stools per day. Will have an occasional liquid or water stool every other day. Does have some abdominal pain, mild.     HBI:  Overall patient well " "being (prior day): 1 (Slightly below par)  Abdominal pain (prior day): 1 (Mild)  Number of liquid or soft stools (prior day): 1 (1 point per stool)  Abdominal mass on exam: 0 (None)  Complications (1 point for each):   Anal fissue    Remission <5  Mild activity 5-7  Moderate activity 8-16  Severe > 16     Extra intestinal manifestations of IBD:  No uveitis/episcleritis  No aphthous ulcers   No arthritis   No erythema nodosum/pyoderma gangrenosum.     sIBDQ:  IBDQ Score Date IBDQ - Total Score  (Higher score better)   11/6/2024   8:04 AM 20      Pertinent labs / imaging:   OSH EGD/Colonoscopy Path (Nov 2022)  A. DUODENUM, BIOPSY:  - CHRONIC INFLAMMATION WITH GASTRIC SURFACE CELL METAPLASIA; FEATURES OF CHRONIC NONSPECIFIC DUODENITIS    B. COLON, RIGHT, BIOPSY:  - NO PATHOLOGIC ABNORMALITY; NO EVIDENCE OF MICROSCOPIC COLITIS    C. COLON, LEFT, BIOPSY:  - NO PATHOLOGIC ABNORMALITY; NO EVIDENCE OF MICROSCOPIC COLITIS    D. COLON, SIGMOID, BIOPSY:  - NO SPECIFIC PATHOLOGIC ABNORMALITY; NO EVIDENCE OF MICROSCOPIC COLITIS  - FOCAL DETACHED LYMPHOID AGGREGATE WITH ASSOCIATED NON-NECROTIZING GRANULOMA (SEE COMMENT)    E. RECTUM, BIOPSY:  - FOCAL ACUTE CRYPTITIS (SEE COMMENT)     ROS:    Constitutional, HEENT, cardiovascular, pulmonary, GI, , musculoskeletal, neuro, skin, endocrine and psych systems are negative, except as otherwise noted.    PHYSICAL EXAMINATION:  Constitutional: aaox3, cooperative, pleasant, not dyspneic/diaphoretic, no acute distress  Vitals reviewed: BP (!) 142/94   Pulse 107   Ht 1.727 m (5' 8\")   Wt (!) 153.4 kg (338 lb 1.6 oz)   SpO2 98%   BMI 51.41 kg/m    Wt:   Wt Readings from Last 2 Encounters:   11/06/24 (!) 153.4 kg (338 lb 1.6 oz)   01/06/20 147.7 kg (325 lb 9.9 oz)      Eyes: Sclera anicteric/injected  Ears/nose/mouth/throat: Normal oropharynx without ulcers or exudate, mucus membranes moist, hearing intact  Neck: supple, thyroid normal size  CV: No edema  Respiratory: Unlabored " breathing  Lymph: No axillary, submandibular, supraclavicular or inguinal lymphadenopathy  Abd: Obese, Nondistended, +bs, no hepatosplenomegaly, nontender, no peritoneal signs  Skin: warm, perfused, no jaundice  Psych: Normal affect  MSK: Normal gait    PERTINENT PAST MEDICAL HISTORY:  Past Medical History:   Diagnosis Date     Adjustment disorder     mixed anxiety and depression     Eating disorder      GERD (gastroesophageal reflux disease)      Hypertriglyceridemia      Insomnia      Menorrhagia      Obesity      CHUCHO (obstructive sleep apnea)      PONV (postoperative nausea and vomiting)      Social anxiety disorder        PREVIOUS SURGERIES:  Past Surgical History:   Procedure Laterality Date     GI SURGERY      EGD     LAPAROSCOPIC GASTRIC SLEEVE N/A 10/12/2016    Procedure: LAPAROSCOPIC GASTRIC SLEEVE;  Surgeon: Dejon Washington MD;  Location: [facility] OR     TONSILLECTOMY & ADENOIDECTOMY       ZC ORAL SURGERY PROCEDURE         ALLERGIES:     Allergies   Allergen Reactions     Penicillins Hives     Pineapple      Throat itching       PERTINENT MEDICATIONS:    Current Outpatient Medications:      CRANBERRY EXTRACT PO, Take 500 mg by mouth , Disp: , Rfl:      ethynodiol-ethinyl estradiol (KELNOR 1/35) 1-35 MG-MCG tablet, , Disp: , Rfl:      ferrous fumarate 65 mg, Ramona. FE,-Vitamin C 125 mg (VITRON-C)  MG TABS tablet, Take 1 tablet by mouth 2 times daily, Disp: 120 tablet, Rfl: 3     HYDROXYZINE HCL PO, Take 50 mg by mouth , Disp: , Rfl:      lamoTRIgine (LAMICTAL) 200 MG tablet, Take 1 tablet (200 mg) by mouth daily, Disp:  , Rfl:      melatonin 5 MG tablet, Take 10 mg by mouth nightly as needed for sleep , Disp: , Rfl:      omeprazole (PRILOSEC) 20 MG capsule, , Disp: , Rfl: 1     Venlafaxine HCl (EFFEXOR XR PO), Take 200 mg by mouth daily , Disp: , Rfl:      prazosin (MINIPRESS) 5 MG capsule, Take 1 capsule (5 mg) by mouth At Bedtime (Patient not taking: Reported on 11/6/2024), Disp: , Rfl:     SOCIAL  HISTORY:  Social History     Socioeconomic History     Marital status: Single     Spouse name: Not on file     Number of children: Not on file     Years of education: Not on file     Highest education level: Not on file   Occupational History     Not on file   Tobacco Use     Smoking status: Never     Smokeless tobacco: Never   Substance and Sexual Activity     Alcohol use: No     Drug use: No     Sexual activity: Not on file   Other Topics Concern     Not on file   Social History Narrative     Not on file     Social Drivers of Health     Financial Resource Strain: Patient Declined (12/14/2022)    Received from [facility]    Overall Financial Resource Strain (CARDIA)      Difficulty of Paying Living Expenses: Patient declined   Food Insecurity: No Food Insecurity (10/8/2024)    Received from [facility]    Hunger Vital Sign      Worried About Running Out of Food in the Last Year: Never true      Ran Out of Food in the Last Year: Never true   Transportation Needs: No Transportation Needs (10/8/2024)    Received from [facility]    PRAPARE - Transportation      Lack of Transportation (Medical): No      Lack of Transportation (Non-Medical): No   Physical Activity: Inactive (10/8/2024)    Received from [facility]    Exercise Vital Sign      Days of Exercise per Week: 0 days      Minutes of Exercise per Session: 0 min   Stress: Stress Concern Present (12/14/2022)    Received from [facility]    [employer] [place] [employer] - Occupational Stress Questionnaire      Feeling of Stress : Very much   Social Connections: Unknown (10/9/2023)    Received from [facility]    Family and Community Support      Help with Day-to-Day Activities: Not on file      Lonely or Isolated: Not on file   Interpersonal Safety: Unknown (10/9/2023)    Received from [facility]    Abuse Screen      Unsafe at Home or Work/School: Not on file      Feels Threatened by Someone?: Not on  file      Does Anyone Keep You from Contacting Others or Doint Things Outside the Home?: Not on file      Physical Sign of Abuse Present: Not on file   Housing Stability: Low Risk  (10/8/2024)    Received from [facility]    Housing Stability      What is your living situation today?: I have a steady place to live       FAMILY HISTORY:  No family history on file.  No family history of IBD, celiac, or colon cancer  + Family history of anxiety     Past/family/social history reviewed and no changes          Again, thank you for allowing me to participate in the care of your patient.        Sincerely,        Tommy Briceño MD

## 2024-11-07 ENCOUNTER — HOME INFUSION (OUTPATIENT)
Dept: HOME HEALTH SERVICES | Facility: HOME HEALTH | Age: 24
End: 2024-11-07
Payer: COMMERCIAL

## 2024-11-07 DIAGNOSIS — K50.10 CROHN'S DISEASE OF PERIANAL REGION (H): Primary | ICD-10-CM

## 2024-11-11 RX ORDER — DIPHENHYDRAMINE HYDROCHLORIDE 50 MG/ML
50 INJECTION INTRAMUSCULAR; INTRAVENOUS PRN
Qty: 99999 ML | Refills: 0 | Status: DISPENSED | OUTPATIENT
Start: 2024-11-11 | End: 2025-11-18

## 2024-11-11 RX ORDER — EPINEPHRINE 0.3 MG/.3ML
0.3 INJECTION SUBCUTANEOUS PRN
Qty: 999999 ML | Refills: 0 | Status: DISPENSED | OUTPATIENT
Start: 2024-11-11 | End: 2025-11-18

## 2024-11-11 RX ORDER — SODIUM CHLORIDE 9 MG/ML
500 INJECTION, SOLUTION INTRAVENOUS PRN
Qty: 999999 ML | Refills: 0 | Status: DISPENSED | OUTPATIENT
Start: 2024-11-11 | End: 2025-11-18

## 2024-11-12 NOTE — TELEPHONE ENCOUNTER
Received notification from [facility] that infliximab 10mg/kg every 6 weeks approved. Patient has appointment scheduled to infuse on 11/19/2024. [employer] message sent to [facility] to complete lab draw prior to infusion for PredictrPK IFX maintenance. Per Dr. Briceño, OK to proceed with PredictrPK with patient being two weeks late for scheduled infusion date.  ___________________________________________________________________________________________________

## 2024-11-13 ENCOUNTER — TELEPHONE (OUTPATIENT)
Dept: GASTROENTEROLOGY | Facility: CLINIC | Age: 24
End: 2024-11-13

## 2024-11-13 NOTE — TELEPHONE ENCOUNTER
Left Voicemail (1st Attempt) and Sent Mychart (1st Attempt) for the patient to call back and schedule the following:    Appointment type: Return IBD  Provider: Dr. Briceño   Return date: 6 mo (around May 2025)  Specialty phone number: [phone]  Additional appointment(s) needed: NA  Additonal Notes: LVM/MyC x1

## 2024-11-18 ENCOUNTER — HOME INFUSION BILLING (OUTPATIENT)
Dept: HOME HEALTH SERVICES | Facility: HOME HEALTH | Age: 24
End: 2024-11-18
Payer: COMMERCIAL

## 2024-11-18 PROCEDURE — S1015 IV TUBING EXTENSION SET: HCPCS

## 2024-11-18 PROCEDURE — E0776 IV POLE: HCPCS

## 2024-11-18 PROCEDURE — A4245 ALCOHOL WIPES PER BOX: HCPCS

## 2024-11-18 PROCEDURE — A4452 WATERPROOF TAPE: HCPCS

## 2024-11-18 PROCEDURE — A4215 STERILE NEEDLE: HCPCS

## 2024-11-19 ENCOUNTER — HOME CARE VISIT (OUTPATIENT)
Dept: HOME HEALTH SERVICES | Facility: HOME HEALTH | Age: 24
End: 2024-11-19
Payer: COMMERCIAL

## 2024-11-19 VITALS
RESPIRATION RATE: 18 BRPM | HEART RATE: 95 BPM | TEMPERATURE: 97.3 F | DIASTOLIC BLOOD PRESSURE: 70 MMHG | SYSTOLIC BLOOD PRESSURE: 132 MMHG | OXYGEN SATURATION: 99 %

## 2024-11-19 PROCEDURE — S9359 HIT ANTI-TNF PER DIEM: HCPCS

## 2024-12-11 ENCOUNTER — TELEPHONE (OUTPATIENT)
Dept: PHARMACY | Facility: CLINIC | Age: 24
End: 2024-12-11

## 2024-12-11 NOTE — TELEPHONE ENCOUNTER
Virtual facilitators were unable to reach x3. Attempted to contact during scheduled MTM visit time, but unable to reach. Left message for return call to re-schedule.

## 2024-12-11 NOTE — TELEPHONE ENCOUNTER
Patient needs to be rescheduled for their virtual visit due to Reason for Reschedule: No-Show    Scheduling team, please refer to service line late cancellation/no-show policies and reach out to patient at a later date for rescheduling.    Appointment mode: Telephone  Provider: Cele Muniz RPH

## 2024-12-12 DIAGNOSIS — K50.10 CROHN'S DISEASE OF PERIANAL REGION (H): Primary | ICD-10-CM

## 2024-12-12 NOTE — PROGRESS NOTES
PredictrPK was not drawn prior to infusion on 11/19/2024 as requested. Updated order placed. [employer] message sent to [facility] to draw prior to infusion on 12/31/2024.

## 2024-12-30 ENCOUNTER — HOME INFUSION BILLING (OUTPATIENT)
Dept: HOME HEALTH SERVICES | Facility: HOME HEALTH | Age: 24
End: 2024-12-30
Payer: COMMERCIAL

## 2024-12-30 PROCEDURE — A4215 STERILE NEEDLE: HCPCS

## 2024-12-30 PROCEDURE — S1015 IV TUBING EXTENSION SET: HCPCS

## 2024-12-31 ENCOUNTER — LAB REQUISITION (OUTPATIENT)
Dept: LAB | Facility: CLINIC | Age: 24
End: 2024-12-31

## 2024-12-31 ENCOUNTER — HOME CARE VISIT (OUTPATIENT)
Dept: HOME HEALTH SERVICES | Facility: HOME HEALTH | Age: 24
End: 2024-12-31
Payer: COMMERCIAL

## 2024-12-31 VITALS
OXYGEN SATURATION: 98 % | TEMPERATURE: 97.7 F | WEIGHT: 293 LBS | DIASTOLIC BLOOD PRESSURE: 78 MMHG | HEART RATE: 94 BPM | RESPIRATION RATE: 18 BRPM | SYSTOLIC BLOOD PRESSURE: 122 MMHG | BODY MASS INDEX: 49.57 KG/M2

## 2024-12-31 DIAGNOSIS — K50.10 CROHN'S DISEASE OF LARGE INTESTINE WITHOUT COMPLICATIONS (H): ICD-10-CM

## 2024-12-31 LAB
ALBUMIN SERPL BCG-MCNC: 3.6 G/DL (ref 3.5–5.2)
ALP SERPL-CCNC: 117 U/L (ref 40–150)
ALT SERPL W P-5'-P-CCNC: 15 U/L (ref 0–50)
AST SERPL W P-5'-P-CCNC: 19 U/L (ref 0–45)
BASOPHILS # BLD AUTO: 0 10E3/UL (ref 0–0.2)
BASOPHILS NFR BLD AUTO: 0 %
BILIRUB DIRECT SERPL-MCNC: <0.2 MG/DL (ref 0–0.3)
BILIRUB SERPL-MCNC: 0.2 MG/DL
CRP SERPL-MCNC: 21.42 MG/L
EOSINOPHIL # BLD AUTO: 0.1 10E3/UL (ref 0–0.7)
EOSINOPHIL NFR BLD AUTO: 1 %
ERYTHROCYTE [DISTWIDTH] IN BLOOD BY AUTOMATED COUNT: 13.3 % (ref 10–15)
HCT VFR BLD AUTO: 37.5 % (ref 35–47)
HGB BLD-MCNC: 12.4 G/DL (ref 11.7–15.7)
IMM GRANULOCYTES # BLD: 0.1 10E3/UL
IMM GRANULOCYTES NFR BLD: 1 %
LYMPHOCYTES # BLD AUTO: 3.5 10E3/UL (ref 0.8–5.3)
LYMPHOCYTES NFR BLD AUTO: 34 %
MCH RBC QN AUTO: 27.7 PG (ref 26.5–33)
MCHC RBC AUTO-ENTMCNC: 33.1 G/DL (ref 31.5–36.5)
MCV RBC AUTO: 84 FL (ref 78–100)
MONOCYTES # BLD AUTO: 0.7 10E3/UL (ref 0–1.3)
MONOCYTES NFR BLD AUTO: 7 %
NEUTROPHILS # BLD AUTO: 5.9 10E3/UL (ref 1.6–8.3)
NEUTROPHILS NFR BLD AUTO: 57 %
NRBC # BLD AUTO: 0 10E3/UL
NRBC BLD AUTO-RTO: 0 /100
PLATELET # BLD AUTO: 425 10E3/UL (ref 150–450)
PROT SERPL-MCNC: 6.9 G/DL (ref 6.4–8.3)
RBC # BLD AUTO: 4.48 10E6/UL (ref 3.8–5.2)
WBC # BLD AUTO: 10.3 10E3/UL (ref 4–11)

## 2024-12-31 PROCEDURE — 85018 HEMOGLOBIN: CPT | Performed by: INTERNAL MEDICINE

## 2024-12-31 PROCEDURE — 81599 UNLISTED MAAA: CPT | Performed by: INTERNAL MEDICINE

## 2024-12-31 PROCEDURE — 85004 AUTOMATED DIFF WBC COUNT: CPT | Performed by: INTERNAL MEDICINE

## 2024-12-31 PROCEDURE — 86140 C-REACTIVE PROTEIN: CPT | Performed by: INTERNAL MEDICINE

## 2024-12-31 PROCEDURE — S9359 HIT ANTI-TNF PER DIEM: HCPCS

## 2024-12-31 PROCEDURE — 80076 HEPATIC FUNCTION PANEL: CPT | Performed by: INTERNAL MEDICINE

## 2024-12-31 PROCEDURE — 99601 HOME NFS VISIT <2 HRS: CPT

## 2024-12-31 NOTE — PROGRESS NOTES
"Nursing Visit Note:  Nurse visit today for infusion and labs for Bi Alves.     present during visit today: Not Applicable.    Infusion Nursing Note:  Skilled nurse visit today for INFLIXIMAB for Bi Alves.   present during visit today: Not Applicable.    Pre-infusion Checklist:   Pre-infusion Checklist    Have you had any delayed reaction since last infusion?   No    Have you recently had an elevated temperature, fever, chills productive cough, coughing for 3 weeks or longer or hemoptysis, abnormal vital signs, night sweats, chest pain, or have you noticed a decrease in your appetite, or noted unexplained weight loss or fatigue?   No    Do you have any open wounds or new incisions?  No    Do you have any recent or upcoming hospitalizations, surgeries, or dental procedures?   No    Do you currently have or recently have had any signs of illness or infection or are you on any antibiotics?   No    Have you had any new, sudden , or worsening abdominal pain?   No    Have you or anyone in your household received a live vaccination in the past 4 weeks?   No    Have you recently been diagnosed with any new nervous system diseases or cancer diagnosis? (i.e., Multiple Sclerosis, Guillain [place], sezures, neurological changes) Are you receiving any form of radiation or chemotherapy?   No    Are you pregnant or breastfeeding, or do you have plans of pregnancy in the future?   No    Have you been having any signs of worsening depression or suicidal ideation?  No    Have there been any other new onset medical symptoms?  No    Did the patient answer \"YES\" to any of the questions above?  No    Will the patient receive a medication that has an order for infusion reaction management?  Yes, and all drugs and supplies are available and none have .    If ordered, has the patient taken pre-medications?  No    Plan:   Therapy is appropriate, will proceed with treatment.     Chemotherapy Treatment " Conditions:   Not Applicable    Intravenous Access:  Labs drawn without difficulty. and Peripheral IV placed.    Post Infusion Assessment:  Patient tolerated infusion without incident.     Note: feeling well today    Saline administered (in mLs): 60    Next Visit Plan:   2/11/25 am visit      Carin Nesbitt RN 12/31/2024  and Lab-Only Nursing Note    Labs obtained via [employer] Tracking number: [id number]    Facility sent to: [facility] [place], [place]    Saline administered (in mLs):   Next Visit Plan:   2/11/25    Carin Nesbitt RN 12/31/2024      Carin Nesbitt RN 12/31/2024

## 2025-01-07 LAB
INFLIXIMAB AB SERPL IA-MCNC: NORMAL U/ML
INFLIXIMAB SERPL-MCNC: 33.6 UG/ML

## 2025-02-10 ENCOUNTER — HOME INFUSION BILLING (OUTPATIENT)
Dept: HOME HEALTH SERVICES | Facility: HOME HEALTH | Age: 25
End: 2025-02-10
Payer: COMMERCIAL

## 2025-02-10 PROCEDURE — A4215 STERILE NEEDLE: HCPCS

## 2025-02-10 PROCEDURE — S1015 IV TUBING EXTENSION SET: HCPCS

## 2025-02-11 ENCOUNTER — HOME CARE VISIT (OUTPATIENT)
Dept: HOME HEALTH SERVICES | Facility: HOME HEALTH | Age: 25
End: 2025-02-11
Payer: COMMERCIAL

## 2025-02-11 VITALS
HEART RATE: 84 BPM | BODY MASS INDEX: 49.54 KG/M2 | TEMPERATURE: 97.5 F | RESPIRATION RATE: 18 BRPM | OXYGEN SATURATION: 98 % | WEIGHT: 293 LBS | SYSTOLIC BLOOD PRESSURE: 124 MMHG | DIASTOLIC BLOOD PRESSURE: 76 MMHG

## 2025-02-11 PROCEDURE — S9359 HIT ANTI-TNF PER DIEM: HCPCS

## 2025-02-11 PROCEDURE — 99601 HOME NFS VISIT <2 HRS: CPT

## 2025-02-11 NOTE — PROGRESS NOTES
Nursing Visit Note:  Nurse visit today for inflximab for Bi Alves.     present during visit today: Not Applicable.    Intravenous Access:  Peripheral IV placed.    Infusion Nursing Note:    Pre-infusion Checklist:   Have you had any delayed reaction since last infusion?   No     Have you recently had an elevated temperature, fever, chills productive cough, coughing for 3 weeks or longer or hemoptysis, abnormal vital signs, night sweats, chest pain, or have you noticed a decrease in your appetite, or noted unexplained weight loss or fatigue?   No     Do you have any open wounds or new incisions?  No     Do you have any recent or upcoming hospitalizations, surgeries, or dental procedures? Does not include esophagogastroduodenoscopy, colonoscopy, endoscopic retrograde cholangiopancreatography (ERCP), endoscopic ultrasound (EUS), dental procedures or joint aspiration/steroid injections.   No     Do you currently have or recently have had any signs of illness or infection or are you on any antibiotics?   No     Have you had any new, sudden, or worsening abdominal pain?   No     Have you or anyone in your household received a live vaccination in the past 4 weeks?   No     Have you recently been diagnosed with any new nervous system diseases or cancer diagnosis? (i.e., Multiple Sclerosis, Guillain [place], seizures, neurological changes) Are you receiving any form of radiation or chemotherapy?   No     Are you pregnant or breastfeeding, or do you have plans of pregnancy in the future?   No     Have you been having any signs of worsening depression or suicidal ideation?  No     Have you had any other new onset medical symptoms?  No    Entyvio/Ocrevus/Tyasabri only: Have you been having any new or worsening medical problems such as issues with thinking, eyesight, balance or strength that have persisted over several days?   N/A    Benlysta only: Have you been having any signs of worsening depression or  "suicidal ideations?    N/A    IVIG only: Have you had any new blood clots?  N/A    Did the patient answer \"YES\" to any of the questions above?  No     Will the patient receive a medication that has an order for infusion reaction management?  Yes, and all drugs and supplies are available and none have .     If ordered, has the patient taken pre-medications?  N/A    Plan:   Therapy is appropriate, will proceed with treatment.     Post Infusion Assessment:  Patient tolerated infusion without incident.  Access discontinued per protocol.  Biologic Infusion Post Education: Call the triage nurse at your clinic or seek medical attention if you have chills and/or temperature greater than or equal to 100.5, uncontrolled nausea/vomiting, diarrhea, constipation, dizziness, shortness of breath, chest pain, heart palpitations, weakness or any other new or concerning symptoms, questions or concerns.  You cannot have any live virus vaccines prior to or during treatment or up to 6 months post infusion.  If you have an upcoming surgery, medical procedure or dental procedure during treatment, this should be discussed with your ordering physician and your surgeon/dentist.  If you are having any concerning symptom, if you are unsure if you should get your next infusion or wish to speak to a provider before your next infusion, please call your care coordinator or triage nurse at your clinic to notify them so we can adequately serve you.     Note: pt tolerating infusions. first IV infiltrated. started new one. now infusing well. had abd pain a few days ago. feeling ok today.    Saline administered: 60 (ml)    Supply Check:   Does the patient have all the supplies they need for the next visit?  Yes    Next visit plan: 3/25/25    Carin Nesbitt RN 2025  "

## 2025-03-20 ENCOUNTER — HOME INFUSION (OUTPATIENT)
Dept: HOME HEALTH SERVICES | Facility: HOME HEALTH | Age: 25
End: 2025-03-20
Payer: COMMERCIAL

## 2025-03-20 NOTE — PROGRESS NOTES
"Video-Visit Details    Type of service:  Video Visit    Video Start Time: 9:05 AM  Video End Time: 9:22 AM    Originating Location (pt. Location): Home    Distant Location (provider location):  Offsite (providers home) [facility] [place]     Platform used for Video Visit: [employer]      New Weight Management Nutrition Consultation    Bi Alves is a 24 year old female presents today for new weight management nutrition consultation.  Patient referred by Dr. Roman on March 31, 2025.    Patient with Co-morbidities of obesity including:      3/30/2025     8:16 PM   --   I have the following health issues associated with obesity GERD (Reflux)   I have the following symptoms associated with obesity Depression    Fatigue         Anthropometrics:    Lowest weight: 220lbs  Highest weight: 340 lbs    Initial weight: 330 lbs on 3/31/25  Estimated body mass index is 50.18 kg/m  as calculated from the following:    Height as of an earlier encounter on 3/31/25: 1.727 m (5' 8\").    Weight as of an earlier encounter on 3/31/25: 149.7 kg (330 lb).    Current weight: 330 lbs on 3/31/25    Medications for Weight Loss:  Zepbound-has not started yet    NUTRITION HISTORY  Food allergies: Pineapple  Food intolerances: burgers, beans, lentils, apples, cherries, watermelon (has Crohn's disease)  Vitamin/Mineral Supplements: Iron, Cranberry extract  Previous methods of diet modification for weight loss: Watching portions/calories, WW, Low carb/high protein, wt loss surgery  RD before: Yes at [facility]    Recent Diet Recall:  Breakfast: (4-5 AM) yogurt, Belvita crackers, orange  Snack: (8-9 AM) toast  Dinner: (3-6:30 PM) variety- some examples: eggs, chicken chili (no beans), Tiki Marsala, grilled chicken + salad, steak + salad. Usually consists of (protein + carb/veg)  Snacks: doesn't snack much but if does, usually after dinner will have grapes + crackers  Dining Out: 1 week; fish on Fridays or every 2 " weeks (subway or paulette)     Tries to stay on Fodmap diet d/t Crohn's disease. Will have bread if it's sourdough or freshly baked otherwise eats gluten free bread and pasta.    Currently is testing out more protein centric snacks such as chicken salad and lactose free cottage cheese.    Hydration: Diet green tea, carbonated water, soda zero occasionally and tea. Believes she gets adequate fluid between all sources.        3/30/2025     8:16 PM   Diet Recall Review with Patient   If you do eat breakfast, what types of food do you eat? Yogurt fruit   If you do eat supper, what types of food do you typically eat? Soup, eggs, rice, chicken salad,   If you do snack, what types of food do you typically eat? Grapes   How many glasses of juice do you drink in a typical day? 2   How many of glasses of milk do you drink in a typical day? 0   How many 8oz glasses of sugar containing drinks such as Jace-Aid/sweet tea do you drink in a day? 1   How many cans/bottles of sugar pop/soda/tea/sports drinks do you drink in a day? 1   How many cans/bottles of diet pop/soda/tea or sports drink do you drink in a day? 1   How often do you have a drink of alcohol? Monthly or Less   If you do drink, how many drinks might you have in a day? 1 or 2           3/30/2025     8:16 PM   Eating Habits   Generally, my meals include foods like these bread, pasta, rice, potatoes, corn, crackers, sweet dessert, pop, or juice Everyday   Generally, my meals include foods like these fried meats, brats, burgers, french fries, pizza, cheese, chips, or ice cream Less Than Weekly   Eat fast food (like McDonalds, Burger Yehuda, Taco Bell) Less Than Weekly   Eat at a buffet or sit-down restaurant Less Than Weekly   Eat most of my meals in front of the TV or computer Almost Everyday   Often skip meals, eat at random times, have no regular eating times Once a Week   Rarely sit down for a meal but snack or graze throughout Less Than Weekly   Eat extra snacks between  meals Once a Week   Eat most of my food at the end of the day A Few Times a Week   Eat in the middle of the night or wake up at night to eat Never   Eat extra snacks to prevent or correct low blood sugar Never   Eat to prevent acid reflux or stomach pain Never   Worry about not having enough food to eat Never   I eat when I am depressed A Few Times a Week   I eat when I am stressed A Few Times a Week   I eat when I am bored Once a Week   I eat when I am anxious Once a Week   I eat when I am happy or as a reward Less Than Weekly   I feel hungry all the time even if I just have eaten Less Than Weekly   Feeling full is important to me Less Than Weekly   I finish all the food on my plate even if I am already full Never   I can't resist eating delicious food or walk past the good food/smell Never   I eat/snack without noticing that I am eating Never   I eat when I am preparing the meal Never   I eat more than usual when I see others eating Never   I have trouble not eating sweets, ice cream, cookies, or chips if they are around the house Never   I think about food all day Never   What foods, if any, do you crave? None   Please list any other foods you crave? Fruit           3/30/2025     8:16 PM   Amount of Food   I feel out of control when eating Never   I eat a large amount of food, like a loaf of bread, a box of cookies, a pint/quart of ice cream, all at once Never   I eat a large amount of food even when I am not hungry Never   I eat rapidly Weekly   I eat alone because I feel embarrassed and do not want others to see how much I have eaten Never   I eat until I am uncomfortably full Never   I feel bad, disgusted, or guilty after I overeat Never       Physical Activity:  Limited movement at the moment with recent flare up of Crohn's and having a sinus infection. Would like to get back into walking. Needs to look for tennis shoes.        3/30/2025     8:16 PM   Activity/Exercise History   How much of a typical 12 hour  "day do you spend sitting? Most of the Day   How much of a typical 12 hour day do you spend lying down? Most of the Day   How much of a typical day do you spend walking/standing? Less Than Half the Day   How many hours (not including work) do you spend on the TV/Video Games/Computer/Tablet/Phone? 6 Hours or More   How many times a week are you active for the purpose of exercise? Never   What keeps you from being more active? Pain    Too tired   How many total minutes do you spend doing some activity for the purpose of exercising when you exercise? Less Than 15 Minutes       Nutrition Prescription  Recommended energy/nutrient modification.    Nutrition Diagnosis  Obesity r/t long history of positive energy balance aeb BMI >30.    Nutrition Intervention  Reviewed current dietary habits and pts history   Discussed long-term goals pt hopes to accomplish in RD appointments Provided education on nutrition considerations when starting incretin-based medication including, changes in meal/snack volumes; meeting protein, hydration and micronutrient needs; limiting high-fat foods; and diet/lifestyle strategies for preventing constipation.  Patient demonstrates understanding. Co-developed goals to work towards. Provided pt with list of goals and resources below via [email or web address].      Expected Engagement: good    Nutrition Goals  1) Aim to increase physical activity with a goal of 2-3 times per week  2) Aim to incorporate a small, protein centric meal/snack around lunch hour to prevent large gaps between meals    The Plate Method  [email or web address]    Protein Sources   [email or web address]     Quick/Easy Protein Sources:  Hard boiled eggs  Part-skim cheese sticks  Baby Bell cheese rounds  Low-fat/low-sugar Greek yogurt  Low-fat cottage cheese  Lean deli meat (chicken/turkey/ham)  Roasted chickpeas or lentils  Nuts   Turkey meat stick  Protein shake/bar  \"P3\" snack (cheese, nuts, deli meat)  [employer]'s \"Protein Bread\" " "  \"Egglife\" egg white wrap    Tuna/salmon can/packet     Summary of Volumetrics Eating Plan  [email or web address]     Follow-Up: 1-2 months    Time spent with patient: 17 minutes.  Carol Becerril, RD, LD  Clee Maravilla, MS, RD, LD          "

## 2025-03-23 ENCOUNTER — EPISODE UPDATE (OUTPATIENT)
Dept: HOME HEALTH SERVICES | Facility: HOME HEALTH | Age: 25
End: 2025-03-23
Payer: COMMERCIAL

## 2025-03-25 ENCOUNTER — TELEPHONE (OUTPATIENT)
Dept: HOME HEALTH SERVICES | Facility: HOME HEALTH | Age: 25
End: 2025-03-25
Payer: COMMERCIAL

## 2025-03-27 ENCOUNTER — HOME INFUSION BILLING (OUTPATIENT)
Dept: HOME HEALTH SERVICES | Facility: HOME HEALTH | Age: 25
End: 2025-03-27
Payer: COMMERCIAL

## 2025-03-27 PROCEDURE — S1015 IV TUBING EXTENSION SET: HCPCS

## 2025-03-27 PROCEDURE — A4215 STERILE NEEDLE: HCPCS

## 2025-03-28 ENCOUNTER — LAB REQUISITION (OUTPATIENT)
Dept: LAB | Facility: CLINIC | Age: 25
End: 2025-03-28
Payer: COMMERCIAL

## 2025-03-28 DIAGNOSIS — K50.10 CROHN'S DISEASE OF LARGE INTESTINE WITHOUT COMPLICATIONS (H): ICD-10-CM

## 2025-03-28 LAB
ALBUMIN SERPL BCG-MCNC: 3.6 G/DL (ref 3.5–5.2)
ALP SERPL-CCNC: 105 U/L (ref 40–150)
ALT SERPL W P-5'-P-CCNC: 17 U/L (ref 0–50)
AST SERPL W P-5'-P-CCNC: 20 U/L (ref 0–45)
BASOPHILS # BLD AUTO: 0 10E3/UL (ref 0–0.2)
BASOPHILS NFR BLD AUTO: 0 %
BILIRUB DIRECT SERPL-MCNC: <0.08 MG/DL (ref 0–0.3)
BILIRUB SERPL-MCNC: 0.2 MG/DL
CRP SERPL-MCNC: 16.96 MG/L
EOSINOPHIL # BLD AUTO: 0.2 10E3/UL (ref 0–0.7)
EOSINOPHIL NFR BLD AUTO: 2 %
ERYTHROCYTE [DISTWIDTH] IN BLOOD BY AUTOMATED COUNT: 13.8 % (ref 10–15)
HCT VFR BLD AUTO: 40.6 % (ref 35–47)
HGB BLD-MCNC: 12.8 G/DL (ref 11.7–15.7)
IMM GRANULOCYTES # BLD: 0 10E3/UL
IMM GRANULOCYTES NFR BLD: 0 %
LYMPHOCYTES # BLD AUTO: 2.3 10E3/UL (ref 0.8–5.3)
LYMPHOCYTES NFR BLD AUTO: 25 %
MCH RBC QN AUTO: 26.6 PG (ref 26.5–33)
MCHC RBC AUTO-ENTMCNC: 31.5 G/DL (ref 31.5–36.5)
MCV RBC AUTO: 84 FL (ref 78–100)
MONOCYTES # BLD AUTO: 0.8 10E3/UL (ref 0–1.3)
MONOCYTES NFR BLD AUTO: 9 %
NEUTROPHILS # BLD AUTO: 5.9 10E3/UL (ref 1.6–8.3)
NEUTROPHILS NFR BLD AUTO: 64 %
NRBC # BLD AUTO: 0 10E3/UL
NRBC BLD AUTO-RTO: 0 /100
PLATELET # BLD AUTO: 403 10E3/UL (ref 150–450)
PROT SERPL-MCNC: 7 G/DL (ref 6.4–8.3)
RBC # BLD AUTO: 4.82 10E6/UL (ref 3.8–5.2)
WBC # BLD AUTO: 9.2 10E3/UL (ref 4–11)

## 2025-03-28 PROCEDURE — 85018 HEMOGLOBIN: CPT | Performed by: INTERNAL MEDICINE

## 2025-03-28 PROCEDURE — 80076 HEPATIC FUNCTION PANEL: CPT | Performed by: INTERNAL MEDICINE

## 2025-03-28 PROCEDURE — S9359 HIT ANTI-TNF PER DIEM: HCPCS

## 2025-03-28 PROCEDURE — 85004 AUTOMATED DIFF WBC COUNT: CPT | Performed by: INTERNAL MEDICINE

## 2025-03-28 PROCEDURE — 86140 C-REACTIVE PROTEIN: CPT | Performed by: INTERNAL MEDICINE

## 2025-03-28 PROCEDURE — 82040 ASSAY OF SERUM ALBUMIN: CPT | Performed by: INTERNAL MEDICINE

## 2025-03-30 ENCOUNTER — HEALTH MAINTENANCE LETTER (OUTPATIENT)
Age: 25
End: 2025-03-30

## 2025-03-30 ASSESSMENT — SLEEP AND FATIGUE QUESTIONNAIRES
HOW LIKELY ARE YOU TO NOD OFF OR FALL ASLEEP WHILE SITTING AND READING: WOULD NEVER DOZE
HOW LIKELY ARE YOU TO NOD OFF OR FALL ASLEEP WHILE SITTING QUIETLY AFTER LUNCH WITHOUT ALCOHOL: WOULD NEVER DOZE
HOW LIKELY ARE YOU TO NOD OFF OR FALL ASLEEP WHILE LYING DOWN TO REST IN THE AFTERNOON WHEN CIRCUMSTANCES PERMIT: SLIGHT CHANCE OF DOZING
HOW LIKELY ARE YOU TO NOD OFF OR FALL ASLEEP WHEN YOU ARE A PASSENGER IN A CAR FOR AN HOUR WITHOUT A BREAK: SLIGHT CHANCE OF DOZING
HOW LIKELY ARE YOU TO NOD OFF OR FALL ASLEEP WHILE SITTING AND TALKING TO SOMEONE: WOULD NEVER DOZE
HOW LIKELY ARE YOU TO NOD OFF OR FALL ASLEEP WHILE SITTING INACTIVE IN A PUBLIC PLACE: WOULD NEVER DOZE
HOW LIKELY ARE YOU TO NOD OFF OR FALL ASLEEP WHILE WATCHING TV: WOULD NEVER DOZE
HOW LIKELY ARE YOU TO NOD OFF OR FALL ASLEEP IN A CAR, WHILE STOPPED FOR A FEW MINUTES IN TRAFFIC: WOULD NEVER DOZE

## 2025-03-31 ENCOUNTER — VIRTUAL VISIT (OUTPATIENT)
Dept: ENDOCRINOLOGY | Facility: CLINIC | Age: 25
End: 2025-03-31
Payer: COMMERCIAL

## 2025-03-31 ENCOUNTER — TELEPHONE (OUTPATIENT)
Dept: ENDOCRINOLOGY | Facility: CLINIC | Age: 25
End: 2025-03-31

## 2025-03-31 VITALS — BODY MASS INDEX: 44.41 KG/M2 | HEIGHT: 68 IN | WEIGHT: 293 LBS

## 2025-03-31 DIAGNOSIS — Z98.84 S/P LAPAROSCOPIC SLEEVE GASTRECTOMY: ICD-10-CM

## 2025-03-31 DIAGNOSIS — Z71.3 NUTRITIONAL COUNSELING: Primary | ICD-10-CM

## 2025-03-31 DIAGNOSIS — K50.10 CROHN'S DISEASE OF PERIANAL REGION WITHOUT COMPLICATION (H): ICD-10-CM

## 2025-03-31 DIAGNOSIS — E66.01 OBESITY, CLASS III, BMI 40-49.9 (MORBID OBESITY) (H): ICD-10-CM

## 2025-03-31 PROCEDURE — 99207 PR NO CHARGE LOS: CPT | Mod: 95 | Performed by: DIETITIAN, REGISTERED

## 2025-03-31 PROCEDURE — 98001 SYNCH AUDIO-VIDEO NEW LOW 30: CPT | Performed by: INTERNAL MEDICINE

## 2025-03-31 PROCEDURE — 97802 MEDICAL NUTRITION INDIV IN: CPT | Mod: 95 | Performed by: DIETITIAN, REGISTERED

## 2025-03-31 NOTE — NURSING NOTE
Is the patient currently in the state of [place]? YES    Location: home    Visit mode:VIDEO    If the visit is dropped, the patient can be reconnected by: VIDEO VISIT: Text to cell phone:   Telephone Information:   Mobile [phone]   Mobile [phone]    and VIDEO VISIT: Send to e-mail at: [email or web address]    Will anyone else be joining the visit? NO  (If patient encounters technical issues they should call [phone]    Are changes needed to the allergy or medication list? No    Are refills needed on medications prescribed by this physician? NO    Reason for visit: Consult      Charito Albright, Virtual Visit Facilitator    QNR Status: completed

## 2025-03-31 NOTE — PROGRESS NOTES
Virtual Visit Details    Joined the call at 3/31/2025, 8:39:03 am.  Left the call at 3/31/2025, 9:00:12 am    Originating Location (pt. Location): Home    Distant Location (provider location):  Off-site  Platform used for Video Visit: [employer]

## 2025-03-31 NOTE — PROGRESS NOTES
"    New Medical Weight Management Consult    PATIENT:  Bi Alves  MRN:         [id number]  :         2000  LYDIA:         3/31/2025    Dear Beverly Armendariz NP,    I had the pleasure of seeing your patient, Bi Alves.  Full intake/assessment done to determine barriers to weight loss success and develop a treatment plan.  Bi Alves is a 24 year old female interested in treatment of medical problems associated with weight.  Her weight today is 330 lbs 0 oz, Body mass index is 50.18 kg/m ., and she has the following co-morbidities:      3/30/2025     8:16 PM   --   I have the following health issues associated with obesity GERD (Reflux)   I have the following symptoms associated with obesity Depression    Fatigue            No data to display                    3/30/2025     8:16 PM   Referring Provider   Please name the provider who referred you to Medical Weight Management  If you do not know, please answer \"I Don't Know\" Dr rangel       Wt Readings from Last 4 Encounters:   24 (!) 149.7 kg (330 lb)   24 (!) 147.8 kg (325 lb 13.4 oz)   24 (!) 147.8 kg (325 lb 13.4 oz)   23 147.9 kg (326 lb)           3/30/2025     8:16 PM   Weight History   How concerned are you about your weight? Very Concerned   I became overweight As a Child   The following factors have contributed to my weight gain Mental Health Issues    Started on Medication that Caused Weight Gain    Lack of Exercise    Stress   I have tried the following methods to lose weight Watching Portions or Calories    Weight Watchers    Atkins-type Diet (Low Carb/High Protein)    Weight Loss Surgery   My lowest weight since age 17 was 220   My highest weight since age 17 was 330   The most weight I have ever lost was (lbs) 340   I have the following family history of obesity/being overweight Many of my relatives are overweight   How has your weight changed over the last year? Gained   How many pounds? 30           " 3/30/2025     8:16 PM   Diet Recall   Glass juice/day 2   Glass milk/day 0   Glass sugary drink/day 1   How many cans/bottles of sugar pop/soda/tea/sports drinks do you drink in a day? 1   Diet drink/day 1   Alcohol Monthly or Less   Drinks/day 1 or 2           3/30/2025     8:16 PM   Eating Habits   Generally, my meals include foods like these bread, pasta, rice, potatoes, corn, crackers, sweet dessert, pop, or juice Everyday   Generally, my meals include foods like these fried meats, brats, burgers, french fries, pizza, cheese, chips, or ice cream Less Than Weekly   Eat fast food (like McDonalds, Burger Yehuda, [employer] Bell) Less Than Weekly   Eat at a buffet or sit-down restaurant Less Than Weekly   Eat most of my meals in front of the TV or computer Almost Everyday   Often skip meals, eat at random times, have no regular eating times Once a Week   Rarely sit down for a meal but snack or graze throughout Less Than Weekly   Eat extra snacks between meals Once a Week   Eat most of my food at the end of the day A Few Times a Week   Eat in the middle of the night or wake up at night to eat Never   Eat extra snacks to prevent or correct low blood sugar Never   Eat to prevent acid reflux or stomach pain Never   Worry about not having enough food to eat Never   I eat when I am depressed A Few Times a Week   I eat when I am stressed A Few Times a Week   I eat when I am bored Once a Week   I eat when I am anxious Once a Week   I eat when I am happy or as a reward Less Than Weekly   I feel hungry all the time even if I just have eaten Less Than Weekly   Feeling full is important to me Less Than Weekly   I finish all the food on my plate even if I am already full Never   I can't resist eating delicious food or walk past the good food/smell Never   I eat/snack without noticing that I am eating Never   I eat when I am preparing the meal Never   I eat more than usual when I see others eating Never   I have trouble not eating  sweets, ice cream, cookies, or chips if they are around the house Never   I think about food all day Never   What foods, if any, do you crave? None   Please list any other foods you crave? Fruit           3/30/2025     8:16 PM   Activity/Exercise History   How much of a typical 12 hour day do you spend sitting? Most of the Day   How much of a typical 12 hour day do you spend lying down? Most of the Day   How much of a typical day do you spend walking/standing? Less Than Half the Day   How many hours (not including work) do you spend on the TV/Video Games/Computer/Tablet/Phone? 6 Hours or More   How many times a week are you active for the purpose of exercise? Never   What keeps you from being more active? Pain    Too tired   How many total minutes do you spend doing some activity for the purpose of exercising when you exercise? Less Than 15 Minutes       PAST MEDICAL HISTORY:  Past Medical History:   Diagnosis Date    Adjustment disorder     mixed anxiety and depression    Eating disorder     GERD (gastroesophageal reflux disease)     Hypertriglyceridemia     Insomnia     Menorrhagia     Obesity     CHUCHO (obstructive sleep apnea)     PONV (postoperative nausea and vomiting)     Social anxiety disorder            3/30/2025     8:16 PM   Work/Social History Reviewed With Patient   My employment status is Disabled   How much of your job is spent on the computer or phone? Less Than 50%   What is your marital status? Single   Who do you live with? Parents   Who does the food shopping? Me           3/30/2025     8:16 PM   Mental Health History Reviewed With Patient   Have you ever been physically or sexually abused? No   How often in the past 2 weeks have you felt little interest or pleasure in doing things? More Than Half the Days   Over the past 2 weeks how often have you felt down, depressed, or hopeless? More Than Half the Days           3/30/2025     8:16 PM   Sleep History Reviewed With Patient   How many hours do  "you sleep at night? 5       MEDICATIONS:   Current Outpatient Medications   Medication Sig Dispense Refill    colestipol (COLESTID) 1 g tablet Take 1 g by mouth 2 times daily.      CRANBERRY EXTRACT PO Take 500 mg by mouth       diphenhydrAMINE (BENADRYL) 50 MG/ML injection Inject 1 mL (50 mg) over 3-5 minutes into the vein via push as needed for other (infusion reaction). For RN use only.  Draw up in a syringe and administer IV push.  Discard remainder of vial. [id number] mL 0    Emergency Supply Kit, PIV, Patient use for emergency only. Contents: 3 sodium chloride 0.9% flushes, 1 IV start kit, 1 microclave ext set 14\", 1 each IV Cath 22 G/1\" and 24G/3/4\", 6 alcohol prep pads, 4 nitrile gloves (med). Call [phone] to reorder. 296181 kit 0    EPINEPHrine (ANY BX GENERIC EQUIV) 0.3 MG/0.3ML injection 2-pack Inject 0.3 mLs (0.3 mg) into the muscle as needed for anaphylaxis (infusion reaction). Administer into the mid-thigh in case of severe anaphylaxis (wheezing, throat tightening, mouth swelling, difficulty breathing). May repeat dose one time in 5-15 minutes if symptoms persist. [id number] mL 0    ferrous fumarate 65 mg, [place]. FE,-Vitamin C 125 mg (VITRON-C)  MG TABS tablet Take 1 tablet by mouth 2 times daily 120 tablet 3    HYDROXYZINE HCL PO Take 50 mg by mouth       inFLIXimab (REMICADE) 1,500 mg in sodium chloride 0.9 % 500 mL via gravity infusion Infuse 1,500 mg over 1 hours into the vein once every six weeks. Flush bag with 20 mL NS to flush tubing. [id number] mL 0    lamoTRIgine (LAMICTAL) 200 MG tablet Take 1 tablet (200 mg) by mouth daily      melatonin 5 MG tablet Take 10 mg by mouth nightly as needed for sleep       methylPREDNISolone Na Suc, PF, (SOLU-MEDROL) 125 mg/2 mL injection Inject 2 mLs (125 mg) over 3-5 minutes into the vein via push as needed (infusion reaction). For RN use only.  Reconstitute vial. Draw up methylPREDNISolone in a syringe and administer.  Discard remainder of vial. [id number] mL 0 " "   omeprazole (PRILOSEC) 20 MG capsule   1    omeprazole (PRILOSEC) 20 MG DR capsule Take 1 capsule (20 mg) by mouth daily. 90 capsule 3    prazosin (MINIPRESS) 5 MG capsule Take 1 capsule (5 mg) by mouth At Bedtime (Patient not taking: Reported on 11/6/2024)      sodium chloride 0.9% infusion Infuse 500 mLs into the vein as needed for other (infusion reaction). In case of mild reaction, administer via gravity at 20 mL/hr to keep vein open. In case of severe reaction, administer via gravity wide open on prime setting. [id number] mL 0    sodium chloride, PF, 0.9% PF flush Inject 10 mLs into the vein as needed for other (infusion reaction). For RN use only as needed for infusion reaction [id number] mL 0    sodium chloride, PF, 0.9% PF flush Inject 10 mLs into the vein as needed for line flush. Flush IV before and after medication administration as directed and/or at least every 12 hours. [id number] mL 0    Venlafaxine HCl (EFFEXOR XR PO) Take 200 mg by mouth daily          ALLERGIES:   Allergies   Allergen Reactions    Penicillins Hives    Pineapple      Throat itching       PHYSICAL EXAM:  Ht 1.727 m (5' 8\")   Wt (!) 149.7 kg (330 lb)   BMI 50.18 kg/m     A & O x 3  HEENT: NCAT, mucous membranes moist  Respirations unlabored  Location of obesity: Mixed Obesity    ASSESSMENT:  Bi is a patient with early onset class 3 obesity without significant element of familial/genetic influence and with current health consequences. She does need aggressive weight loss plan due to GERD, inflammation from Crohn's disease.  Had bariatric gastric sleeve surgery at age 16 which worked for some time with weight loss of ~80. Gained back in pandemic and thereafter. Did have some weight loss with active Crohn's in 2022.      Bi Alves eats a high carb diet and mostly eats during the evening. \"Don't eat much junk food.\" Eats eggs, yogurt and fruit. Goes gluten free. Feels hungry especially in evening.    Her problem is complicated by a " hunger disorder and gender and short stature    Her ability to lose weight is impacted by lack of confidence.    PLAN:    Meal planning - focus on no between meal snacking, aggressive lowering of starches and cheese    Strong genetic or hunger disorder  Ancillary testing:  N/A.  Food Plan:  focus on no between meal snacking, aggressive lowering of starches and cheese.  Activity Plan:  Activity journal.  Supplementary:  N/A.  Medication:  The patient will begin medication in pursuit of improved medical status as influenced by body weight. She will start Zepbound (she confrims insurance coverage).  There is a mutual understanding of the goals and risks of this therapy. The patient is in agreement. She is educated on dosage regimen and possible side effects.    RTC:    12 weeks.    Sincerely,  Juan Luis Roman MD

## 2025-03-31 NOTE — LETTER
"3/31/2025       RE: Bi Alves  [address] [facility] [address]     Dear Colleague,    Thank you for referring your patient, Bi Alves, to the [facility] [place] at [facility]. Please see a copy of my visit note below.    Video-Visit Details    Type of service:  Video Visit    Video Start Time: 9:05 AM  Video End Time: 9:22 AM    Originating Location (pt. Location): Home    Distant Location (provider location):  Offsite (providers home) [facility] [place]     Platform used for Video Visit: [employer] Work [employer]      New Weight Management Nutrition Consultation    Bi Alves is a 24 year old female presents today for new weight management nutrition consultation.  Patient referred by Dr. Roman on March 31, 2025.    Patient with Co-morbidities of obesity including:      3/30/2025     8:16 PM   --   I have the following health issues associated with obesity GERD (Reflux)   I have the following symptoms associated with obesity Depression    Fatigue         Anthropometrics:    Lowest weight: 220lbs  Highest weight: 340 lbs    Initial weight: 330 lbs on 3/31/25  Estimated body mass index is 50.18 kg/m  as calculated from the following:    Height as of an earlier encounter on 3/31/25: 1.727 m (5' 8\").    Weight as of an earlier encounter on 3/31/25: 149.7 kg (330 lb).    Current weight: 330 lbs on 3/31/25    Medications for Weight Loss:  Zepbound-has not started yet    NUTRITION HISTORY  Food allergies: Pineapple  Food intolerances: burgers, beans, lentils, apples, cherries, watermelon (has Crohn's disease)  Vitamin/Mineral Supplements: Iron, Cranberry extract  Previous methods of diet modification for weight loss: Watching portions/calories, WW, Low carb/high protein, wt loss surgery  RD before: Yes at [facility]    Recent Diet Recall:  Breakfast: (4-5 AM) yogurt, Belvita crackers, " orange  Snack: (8-9 AM) toast  Dinner: (3-6:30 PM) variety- some examples: eggs, chicken chili (no beans), Tiki Marsala, grilled chicken + salad, steak + salad. Usually consists of (protein + carb/veg)  Snacks: doesn't snack much but if does, usually after dinner will have grapes + crackers  Dining Out: 1 week; fish on Fridays or every 2 weeks (subway or soup)     Tries to stay on Fodmap diet d/t Crohn's disease. Will have bread if it's sourdough or freshly baked otherwise eats gluten free bread and pasta.    Currently is testing out more protein centric snacks such as chicken salad and lactose free cottage cheese.    Hydration: Diet green tea, carbonated water, soda zero occasionally and tea. Believes she gets adequate fluid between all sources.        3/30/2025     8:16 PM   Diet Recall Review with Patient   If you do eat breakfast, what types of food do you eat? Yogurt fruit   If you do eat supper, what types of food do you typically eat? Soup, eggs, rice, chicken salad,   If you do snack, what types of food do you typically eat? Grapes   How many glasses of juice do you drink in a typical day? 2   How many of glasses of milk do you drink in a typical day? 0   How many 8oz glasses of sugar containing drinks such as Jace-Aid/sweet tea do you drink in a day? 1   How many cans/bottles of sugar pop/soda/tea/sports drinks do you drink in a day? 1   How many cans/bottles of diet pop/soda/tea or sports drink do you drink in a day? 1   How often do you have a drink of alcohol? Monthly or Less   If you do drink, how many drinks might you have in a day? 1 or 2           3/30/2025     8:16 PM   Eating Habits   Generally, my meals include foods like these bread, pasta, rice, potatoes, corn, crackers, sweet dessert, pop, or juice Everyday   Generally, my meals include foods like these fried meats, brats, burgers, french fries, pizza, cheese, chips, or ice cream Less Than Weekly   Eat fast food (like [employer], [employer],  Taco Bell) Less Than Weekly   Eat at a buffet or sit-down restaurant Less Than Weekly   Eat most of my meals in front of the TV or computer Almost Everyday   Often skip meals, eat at random times, have no regular eating times Once a Week   Rarely sit down for a meal but snack or graze throughout Less Than Weekly   Eat extra snacks between meals Once a Week   Eat most of my food at the end of the day A Few Times a Week   Eat in the middle of the night or wake up at night to eat Never   Eat extra snacks to prevent or correct low blood sugar Never   Eat to prevent acid reflux or stomach pain Never   Worry about not having enough food to eat Never   I eat when I am depressed A Few Times a Week   I eat when I am stressed A Few Times a Week   I eat when I am bored Once a Week   I eat when I am anxious Once a Week   I eat when I am happy or as a reward Less Than Weekly   I feel hungry all the time even if I just have eaten Less Than Weekly   Feeling full is important to me Less Than Weekly   I finish all the food on my plate even if I am already full Never   I can't resist eating delicious food or walk past the good food/smell Never   I eat/snack without noticing that I am eating Never   I eat when I am preparing the meal Never   I eat more than usual when I see others eating Never   I have trouble not eating sweets, ice cream, cookies, or chips if they are around the house Never   I think about food all day Never   What foods, if any, do you crave? None   Please list any other foods you crave? Fruit           3/30/2025     8:16 PM   Amount of Food   I feel out of control when eating Never   I eat a large amount of food, like a loaf of bread, a box of cookies, a pint/quart of ice cream, all at once Never   I eat a large amount of food even when I am not hungry Never   I eat rapidly Weekly   I eat alone because I feel embarrassed and do not want others to see how much I have eaten Never   I eat until I am uncomfortably full  Never   I feel bad, disgusted, or guilty after I overeat Never       Physical Activity:  Limited movement at the moment with recent flare up of Crohn's and having a sinus infection. Would like to get back into walking. Needs to look for tennis shoes.        3/30/2025     8:16 PM   Activity/Exercise History   How much of a typical 12 hour day do you spend sitting? Most of the Day   How much of a typical 12 hour day do you spend lying down? Most of the Day   How much of a typical day do you spend walking/standing? Less Than Half the Day   How many hours (not including work) do you spend on the TV/Video Games/Computer/Tablet/Phone? 6 Hours or More   How many times a week are you active for the purpose of exercise? Never   What keeps you from being more active? Pain    Too tired   How many total minutes do you spend doing some activity for the purpose of exercising when you exercise? Less Than 15 Minutes       Nutrition Prescription  Recommended energy/nutrient modification.    Nutrition Diagnosis  Obesity r/t long history of positive energy balance aeb BMI >30.    Nutrition Intervention  Reviewed current dietary habits and pts history   Discussed long-term goals pt hopes to accomplish in RD appointments Provided education on nutrition considerations when starting incretin-based medication including, changes in meal/snack volumes; meeting protein, hydration and micronutrient needs; limiting high-fat foods; and diet/lifestyle strategies for preventing constipation.  Patient demonstrates understanding. Co-developed goals to work towards. Provided pt with list of goals and resources below via [employer].      Expected Engagement: good    Nutrition Goals  1) Aim to increase physical activity with a goal of 2-3 times per week  2) Aim to incorporate a small, protein centric meal/snack around lunch hour to prevent large gaps between meals    The Plate Method  [email or web address]    Protein Sources  "  [email or web address]     Quick/Easy Protein Sources:  Hard boiled eggs  Part-skim cheese sticks  Baby Bell cheese rounds  Low-fat/low-sugar Greek yogurt  Low-fat cottage cheese  Lean deli meat (chicken/turkey/ham)  Roasted chickpeas or lentils  Nuts   Turkey meat stick  Protein shake/bar  \"P3\" snack (cheese, nuts, deli meat)  Aldi's \"Protein Bread\"   \"Egglife\" egg white wrap    Tuna/salmon can/packet     Summary of Volumetrics Eating Plan  [email or web address]     Follow-Up: 1-2 months    Time spent with patient: 17 minutes.  Carol Becerril RD, LD  Cele Maravilla, MS, RD, LD            Again, thank you for allowing me to participate in the care of your patient.      Sincerely,    Carol Becerril RD    "

## 2025-03-31 NOTE — TELEPHONE ENCOUNTER
The prescribed drug/drug class Zepbound is classified as a Plan Exclusion, meaning it is not covered under the plan for any indication.  Consequently, the liaison team will not be submitting a prior authorization for this drug or any drug within this weight loss. The order will be sent to the patient's preferred pharmacy, and the patient will need to pay out of pocket.

## 2025-03-31 NOTE — PATIENT INSTRUCTIONS
"Juvenal Osorio,    It was a pleasure meeting with you today.    Follow-up with RD 1-2 months.    Thank you,    Cele Maravilla, TASNEEM, LD  If you would like to schedule or reschedule an appointment with the RD, please call [phone]    Nutrition Goals  1) Aim to increase physical activity with a goal of 2-3 times per week  2) Aim to incorporate a small, protein centric meal/snack around lunch hour to prevent large gaps between meals    The Plate Method  [email or web address]    Protein Sources   [email or web address]     Quick/Easy Protein Sources:  Hard boiled eggs  Part-skim cheese sticks  Baby Bell cheese rounds  Low-fat/low-sugar Greek yogurt  Low-fat cottage cheese  Lean deli meat (chicken/turkey/ham)  Roasted chickpeas or lentils  Nuts   Turkey meat stick  Protein shake/bar  \"P3\" snack (cheese, nuts, deli meat)  Aldi's \"Protein Bread\"   \"Egglife\" egg white wrap    Tuna/salmon can/packet     Summary of Volumetrics Eating Plan  [email or web address]     COMPREHENSIVE WEIGHT MANAGEMENT PROGRAM  VIRTUAL SUPPORT GROUPS    At [facility], our Comprehensive Weight Management program offers on-line support groups for patients who are working on weight loss and considering, preparing for, or have had weight loss surgery.     There is no cost for this opportunity.  You are invited to attend the?Virtual Support Groups?provided by any of the following locations:    [facility] via [employer] Teams with Betsy Haq RD, RN  2.   [place] via [employer] Teams with Dexter Salmeron, PhD, LP  3.   [place] via [employer] with Holly Solitario RN  4.   [facility] via a Zoom Meeting with NATHAN Gonzalez    The following Support Group information can also be found on our website:  [email or web address]      [facility]   WEIGHT LOSS SURGERY SUPPORT GROUP  The support group is a patient-lead forum " that meets monthly to share experiences, encouragement and education. It is open to those who have had weight loss surgery, are scheduled for surgery, or are considering surgery.   WHEN: 3rd Wednesday of each month from 5:00PM - 6:00PM using Microsoft Teams.   FACILITATOR: Led by Betsy Allen RD, LD, RN, the program's Clinical Coordinator.   TO REGISTER: Please contact the clinic via [employer] or call the nurse line directly at [phone] to inform our staff that you would like an invite sent to you and to let us know the email you would like the invite sent to. Prior to the meeting, a link with directions on how to join the meeting will be sent to you.    2025 Meetings, 3rd Wednesday, 5:00pm - 6:00pm    January 15: Let's Talk  February19: Let's Talk  March 19: Speaker: Darnell Flores RD, LD  April 16: Let's Talk  May 21: Speaker: Liss Beach RD, SHELBIE  June18: Let's Talk  July 16: Let's Talk  August 20: Let's Talk  September 17: No meeting.  October 15: Speaker: Jessie Mota PsyD, LP  November 19: Let's Talk  December 17: Let's Talk    [facility] BARIATRIC CARE SUPPORT GROUP  This is open to all pre- and post- operative bariatric surgery patients as well as their support system.   WHEN: 3rd Tuesday of each month from 6:30 PM - 8:00 PM using Microsoft Teams.   FACILITATOR: Led by Dexter Salmeron, Ph.D who is a Licensed Psychologist with the [facility] Comprehensive [facility] Management Program.   TO REGISTER: Please send an email to Dexter Salmeron, Ph.D., LP at?[email or web address]?if you would like an invitation to the group. Prior to the meeting, a link with directions on how to join the meeting will be sent to you.    2025 Meetings, 3rd Tuesday January 21st: Open Forum  February 18th: Medications and Bariatric Surgery  Speaker: Milly Smith, [facility]'s Pharmacy Resident  March 18th: Open Forum  April 15th: Genetics of Obesity as well as Q&A,  Speaker: Tiff Elizondo MD, [facility] Comprehensive Weight Management Program.  May 20th: Open Forum  June 17th: Nutritional Labeling, Speaker: TBDANIELA  July 15th: Open Forum  August 19th: Open Forum  September 16th: Open Forum  October 21st: Open Forum  November 18th: Holiday Eating, Speaker: CT  December 16th: Open Forum    [facility] POST-OPERATIVE BARIATRIC SURGERY SUPPORT GROUP  This is a support group for [facility] bariatric patients (and those external to [facility]) who have had bariatric surgery and are at least 3 months post-surgery.  WHEN: 4th Thursday of the month from 11:00 AM - 12:00 PM using Microsoft Teams.   FACILITATOR: Led by Certified Bariatric Nurse, Holly Solitario RN, CBN.   TO REGISTER: Please send an email to Holly at [email or web address] if you would like an invitation to the group.  Prior to the meeting, a link with directions on how to join the meeting will be sent to you.    2025 Meetings, 4th Thursday, 11:00am - 12:pm  January 23  February 27  March 27  April 24  May 22  Alyssa 26  July 24  August 28  September 25  October 23  November 27: Thanksgiving Day, No meeting.      December 25: [place] Day, No meeting.        [facility]   HEALTHY LIFESTYLE COACHING GROUP  This is a 60 minute virtual coaching group for those who want to lead a healthier lifestyle. Come together to set goals and overcome barriers in a supportive group environment. We will address the four pillars of health: nutrition, exercise, sleep, and emotional well-being.   WHEN: 1st Friday of the month, 12:30 PM - 1:30 PM   using a Zoom meeting.     FACILITATOR: Led by National Board-Certified Health and , Holly Hoffman [facility].  TO REGISTER: Please call the [employer] at [phone] to register. You will get an appointment to attend in [place]. Fifteen minutes prior to the  meeting, complete the e-check in and you will get the link to join the meeting.  There is no charge to attend this group and space is limited.  Please register for each month you wish to attend    2025 Meetings, 1st Friday, 12:30pm-1:30pm  January 3  February 7  March 7: No meeting.  April 4  May 2  Alyssa 6  July 4: Fourth of July Holiday, No meeting.    August 1  September 5  October 3  November 7  December 5

## 2025-03-31 NOTE — NURSING NOTE
Is the patient currently in the state of [place]? YES    Location: home    Visit mode:VIDEO    If the visit is dropped, the patient can be reconnected by: VIDEO VISIT: Text to cell phone:   Telephone Information:   Mobile [phone]   Mobile [phone]    and VIDEO VISIT: Send to e-mail at: [email or web address]    Will anyone else be joining the visit? NO  (If patient encounters technical issues they should call [phone]    Are changes needed to the allergy or medication list? No    Are refills needed on medications prescribed by this physician? NO    Reason for visit: Consult      Charito Albright, Virtual Visit Facilitator    QNR Status: completed

## 2025-03-31 NOTE — LETTER
"3/31/2025       RE: Bi Alves  [address] [facility] [address]     Dear Colleague,    Thank you for referring your patient, Bi Alves, to the [facility] WEIGHT MANAGEMENT CLINIC [place] at [facility]. Please see a copy of my visit note below.    Virtual Visit Details    Joined the call at 3/31/2025, 8:39:03 am.  Left the call at 3/31/2025, 9:00:12 am    Originating Location (pt. Location): Home    Distant Location (provider location):  Off-site  Platform used for Video Visit: [facility] Medical Weight Management Consult    PATIENT:  Bi Alves  MRN:         [id number]  :         2000  LYDIA:         3/31/2025    Dear Beverly Armendariz NP,    I had the pleasure of seeing your patient, Bi Alves.  Full intake/assessment done to determine barriers to weight loss success and develop a treatment plan.  Bi Alves is a 24 year old female interested in treatment of medical problems associated with weight.  Her weight today is 330 lbs 0 oz, Body mass index is 50.18 kg/m ., and she has the following co-morbidities:      3/30/2025     8:16 PM   --   I have the following health issues associated with obesity GERD (Reflux)   I have the following symptoms associated with obesity Depression    Fatigue            No data to display                    3/30/2025     8:16 PM   Referring Provider   Please name the provider who referred you to Medical Weight Management  If you do not know, please answer \"I Don't Know\" Dr rangel       Wt Readings from Last 4 Encounters:   24 (!) 149.7 kg (330 lb)   25 (!) 147.8 kg (325 lb 13.4 oz)   25 (!) 147.8 kg (325 lb 13.4 oz)   23 147.9 kg (326 lb)           3/30/2025     8:16 PM   Weight History   How concerned are you about your weight? Very Concerned   I became overweight As a Child   The following factors have contributed to my weight gain Mental Health Issues "    Started on Medication that Caused Weight Gain    Lack of Exercise    Stress   I have tried the following methods to lose weight Watching Portions or Calories    Weight Watchers    Atkins-type Diet (Low Carb/High Protein)    Weight Loss Surgery   My lowest weight since age 17 was 220   My highest weight since age 17 was 330   The most weight I have ever lost was (lbs) 340   I have the following family history of obesity/being overweight Many of my relatives are overweight   How has your weight changed over the last year? Gained   How many pounds? 30           3/30/2025     8:16 PM   Diet Recall   Glass juice/day 2   Glass milk/day 0   Glass sugary drink/day 1   How many cans/bottles of sugar pop/soda/tea/sports drinks do you drink in a day? 1   Diet drink/day 1   Alcohol Monthly or Less   Drinks/day 1 or 2           3/30/2025     8:16 PM   Eating Habits   Generally, my meals include foods like these bread, pasta, rice, potatoes, corn, crackers, sweet dessert, pop, or juice Everyday   Generally, my meals include foods like these fried meats, brats, burgers, french fries, pizza, cheese, chips, or ice cream Less Than Weekly   Eat fast food (like McDonalds, [employer]ehuda, Taco Bell) Less Than Weekly   Eat at a buffet or sit-down restaurant Less Than Weekly   Eat most of my meals in front of the TV or computer Almost Everyday   Often skip meals, eat at random times, have no regular eating times Once a Week   Rarely sit down for a meal but snack or graze throughout Less Than Weekly   Eat extra snacks between meals Once a Week   Eat most of my food at the end of the day A Few Times a Week   Eat in the middle of the night or wake up at night to eat Never   Eat extra snacks to prevent or correct low blood sugar Never   Eat to prevent acid reflux or stomach pain Never   Worry about not having enough food to eat Never   I eat when I am depressed A Few Times a Week   I eat when I am stressed A Few Times a Week   I eat when I am  bored Once a Week   I eat when I am anxious Once a Week   I eat when I am happy or as a reward Less Than Weekly   I feel hungry all the time even if I just have eaten Less Than Weekly   Feeling full is important to me Less Than Weekly   I finish all the food on my plate even if I am already full Never   I can't resist eating delicious food or walk past the good food/smell Never   I eat/snack without noticing that I am eating Never   I eat when I am preparing the meal Never   I eat more than usual when I see others eating Never   I have trouble not eating sweets, ice cream, cookies, or chips if they are around the house Never   I think about food all day Never   What foods, if any, do you crave? None   Please list any other foods you crave? Fruit           3/30/2025     8:16 PM   Activity/Exercise History   How much of a typical 12 hour day do you spend sitting? Most of the Day   How much of a typical 12 hour day do you spend lying down? Most of the Day   How much of a typical day do you spend walking/standing? Less Than Half the Day   How many hours (not including work) do you spend on the TV/Video Games/Computer/Tablet/Phone? 6 Hours or More   How many times a week are you active for the purpose of exercise? Never   What keeps you from being more active? Pain    Too tired   How many total minutes do you spend doing some activity for the purpose of exercising when you exercise? Less Than 15 Minutes       PAST MEDICAL HISTORY:  Past Medical History:   Diagnosis Date     Adjustment disorder     mixed anxiety and depression     Eating disorder      GERD (gastroesophageal reflux disease)      Hypertriglyceridemia      Insomnia      Menorrhagia      Obesity      CHUCHO (obstructive sleep apnea)      PONV (postoperative nausea and vomiting)      Social anxiety disorder            3/30/2025     8:16 PM   Work/Social History Reviewed With Patient   My employment status is Disabled   How much of your job is spent on the  "computer or phone? Less Than 50%   What is your marital status? Single   Who do you live with? Parents   Who does the food shopping? Me           3/30/2025     8:16 PM   Mental Health History Reviewed With Patient   Have you ever been physically or sexually abused? No   How often in the past 2 weeks have you felt little interest or pleasure in doing things? More Than Half the Days   Over the past 2 weeks how often have you felt down, depressed, or hopeless? More Than Half the Days           3/30/2025     8:16 PM   Sleep History Reviewed With Patient   How many hours do you sleep at night? 5       MEDICATIONS:   Current Outpatient Medications   Medication Sig Dispense Refill     colestipol (COLESTID) 1 g tablet Take 1 g by mouth 2 times daily.       CRANBERRY EXTRACT PO Take 500 mg by mouth        diphenhydrAMINE (BENADRYL) 50 MG/ML injection Inject 1 mL (50 mg) over 3-5 minutes into the vein via push as needed for other (infusion reaction). For RN use only.  Draw up in a syringe and administer IV push.  Discard remainder of vial. 84219 mL 0     Emergency Supply Kit, PIV, Patient use for emergency only. Contents: 3 sodium chloride 0.9% flushes, 1 IV start kit, 1 microclave ext set 14\", 1 each IV Cath 22 G/1\" and 24G/3/4\", 6 alcohol prep pads, 4 nitrile gloves (med). Call [phone] to reorder. 742417 kit 0     EPINEPHrine (ANY BX GENERIC EQUIV) 0.3 MG/0.3ML injection 2-pack Inject 0.3 mLs (0.3 mg) into the muscle as needed for anaphylaxis (infusion reaction). Administer into the mid-thigh in case of severe anaphylaxis (wheezing, throat tightening, mouth swelling, difficulty breathing). May repeat dose one time in 5-15 minutes if symptoms persist. [id number] mL 0     ferrous fumarate 65 mg, Hughes. FE,-Vitamin C 125 mg (VITRON-C)  MG TABS tablet Take 1 tablet by mouth 2 times daily 120 tablet 3     HYDROXYZINE HCL PO Take 50 mg by mouth        inFLIXimab (REMICADE) 1,500 mg in sodium chloride 0.9 % 500 mL via " "gravity infusion Infuse 1,500 mg over 1 hours into the vein once every six weeks. Flush bag with 20 mL NS to flush tubing. 419352 mL 0     lamoTRIgine (LAMICTAL) 200 MG tablet Take 1 tablet (200 mg) by mouth daily       melatonin 5 MG tablet Take 10 mg by mouth nightly as needed for sleep        methylPREDNISolone Na Suc, PF, (SOLU-MEDROL) 125 mg/2 mL injection Inject 2 mLs (125 mg) over 3-5 minutes into the vein via push as needed (infusion reaction). For RN use only.  Reconstitute vial. Draw up methylPREDNISolone in a syringe and administer.  Discard remainder of vial. 004318 mL 0     omeprazole (PRILOSEC) 20 MG capsule   1     omeprazole (PRILOSEC) 20 MG DR capsule Take 1 capsule (20 mg) by mouth daily. 90 capsule 3     prazosin (MINIPRESS) 5 MG capsule Take 1 capsule (5 mg) by mouth At Bedtime (Patient not taking: Reported on 11/6/2024)       sodium chloride 0.9% infusion Infuse 500 mLs into the vein as needed for other (infusion reaction). In case of mild reaction, administer via gravity at 20 mL/hr to keep vein open. In case of severe reaction, administer via gravity wide open on prime setting. 419920 mL 0     sodium chloride, PF, 0.9% PF flush Inject 10 mLs into the vein as needed for other (infusion reaction). For RN use only as needed for infusion reaction [id number] mL 0     sodium chloride, PF, 0.9% PF flush Inject 10 mLs into the vein as needed for line flush. Flush IV before and after medication administration as directed and/or at least every 12 hours. 503008 mL 0     Venlafaxine HCl (EFFEXOR XR PO) Take 200 mg by mouth daily          ALLERGIES:   Allergies   Allergen Reactions     Penicillins Hives     Pineapple      Throat itching       PHYSICAL EXAM:  Ht 1.727 m (5' 8\")   Wt (!) 149.7 kg (330 lb)   BMI 50.18 kg/m     A & O x 3  HEENT: NCAT, mucous membranes moist  Respirations unlabored  Location of obesity: Mixed Obesity    ASSESSMENT:  Bi is a patient with early onset class 3 obesity without " "significant element of familial/genetic influence and with current health consequences. She does need aggressive weight loss plan due to GERD, inflammation from Crohn's disease.  Had bariatric gastric sleeve surgery at age 16 which worked for some time with weight loss of ~80. Gained back in pandemic and thereafter. Did have some weight loss with active Crohn's in 2022.      Bi Alves eats a high carb diet and mostly eats during the evening. \"Don't eat much junk food.\" Eats eggs, yogurt and fruit. Goes gluten free. Feels hungry especially in evening.    Her problem is complicated by a hunger disorder and gender and short stature    Her ability to lose weight is impacted by lack of confidence.    PLAN:    Meal planning - focus on no between meal snacking, aggressive lowering of starches and cheese    Strong genetic or hunger disorder  Ancillary testing:  N/A.  Food Plan:  focus on no between meal snacking, aggressive lowering of starches and cheese.  Activity Plan:  Activity journal.  Supplementary:  N/A.  Medication:  The patient will begin medication in pursuit of improved medical status as influenced by body weight. She will start Zepbound (she confrims insurance coverage).  There is a mutual understanding of the goals and risks of this therapy. The patient is in agreement. She is educated on dosage regimen and possible side effects.    RTC:    12 weeks.    Sincerely,  Juan Luis Roman MD            Again, thank you for allowing me to participate in the care of your patient.      Sincerely,    Juan Luis Roman MD    "

## 2025-04-09 ENCOUNTER — TELEPHONE (OUTPATIENT)
Dept: ENDOCRINOLOGY | Facility: CLINIC | Age: 25
End: 2025-04-09
Payer: COMMERCIAL

## 2025-04-09 NOTE — TELEPHONE ENCOUNTER
PA Initiation    Medication: ZEPBOUND 2.5 MG/0.5ML SC SOAJ  Insurance Company: [employer] Non-Specialty PA's - Phone [phone] Fax [phone]  Pharmacy Filling the Rx: [facility] #[id number] [place], [place] - [facility] [address] [facility] AT [address]  Filling Pharmacy Phone: [phone]  Filling Pharmacy Fax: [phone]  Start Date: 4/9/2025

## 2025-04-10 ENCOUNTER — TELEPHONE (OUTPATIENT)
Dept: ENDOCRINOLOGY | Facility: CLINIC | Age: 25
End: 2025-04-10
Payer: COMMERCIAL

## 2025-04-10 NOTE — TELEPHONE ENCOUNTER
Left Voicemail (1st Attempt) and Sent ViajaNethart (1st Attempt) for the patient to call back and schedule the following:    Appointment type: Return Weight Management  Appointment mode: Virtual Visit  Provider: Dr. Juan Luis Roman  Return date: Approx. 7/31/25  Specialty phone number: [phone]    Additional Notes:

## 2025-04-15 NOTE — TELEPHONE ENCOUNTER
Called insurance ([phone]) spoke to Inez she stated PA is in review and had a pending 8 more days for determination  Case ID [id number]

## 2025-04-21 NOTE — TELEPHONE ENCOUNTER
PA Approved for [facility]. Called pharmacy and gave the approval to Ema she stated patient already picked up. Closing encounter

## 2025-04-21 NOTE — TELEPHONE ENCOUNTER
Prior Authorization Approval    Medication: ZEPBOUND 2.5 MG/0.5ML SC SOAJ  Authorization Effective Date: 3/10/2025  Authorization Expiration Date: 12/14/2025  Approved Dose/Quantity: 1 month  Reference #: [id number]   Insurance Company: [employer] Non-Specialty PA's - Phone [phone] Fax [phone]  Expected CoPay: $    CoPay Card Available:      Financial Assistance Needed:    Which Pharmacy is filling the prescription: [facility] #[id number] [facility] [address] [place] [address] AT [facility]  Pharmacy Notified: called pharmacy  Patient Notified: [employer] message sent to pt

## 2025-04-25 ENCOUNTER — HOME INFUSION (OUTPATIENT)
Dept: HOME HEALTH SERVICES | Facility: HOME HEALTH | Age: 25
End: 2025-04-25
Payer: COMMERCIAL

## 2025-04-25 DIAGNOSIS — K50.10 CROHN'S DISEASE OF PERIANAL REGION WITHOUT COMPLICATION (H): Primary | ICD-10-CM

## 2025-05-07 ENCOUNTER — TELEPHONE (OUTPATIENT)
Dept: ENDOCRINOLOGY | Facility: CLINIC | Age: 25
End: 2025-05-07
Payer: COMMERCIAL

## 2025-05-07 DIAGNOSIS — E66.813 OBESITY, CLASS III, BMI 40-49.9 (MORBID OBESITY) (H): Primary | ICD-10-CM

## 2025-05-07 NOTE — TELEPHONE ENCOUNTER
PA Initiation    Medication: ZEPBOUND 5 MG/0.5ML SC SOAJ  Insurance Company: [employer] - Phone [phone] Fax [phone]  Pharmacy Filling the Rx: [facility] #[id number] [place] [address] [place] [place] [facility] AT [address]  Filling Pharmacy Phone: [phone]  Filling Pharmacy Fax: [phone]  Start Date: 5/7/2025

## 2025-05-08 NOTE — TELEPHONE ENCOUNTER
PRIOR AUTHORIZATION DENIED    Medication: ZEPBOUND 5 MG/0.5ML SC SOAJ  Insurance Company: [employer] - Phone [phone] Fax [phone]  Denial Date: 5/7/2025  Denial Reason(s):   Appeal Information:   Patient Notified: clinic to discuss with pt

## 2025-05-09 ENCOUNTER — LAB REQUISITION (OUTPATIENT)
Dept: LAB | Facility: CLINIC | Age: 25
End: 2025-05-09
Payer: COMMERCIAL

## 2025-05-09 ENCOUNTER — HOME INFUSION BILLING (OUTPATIENT)
Dept: HOME HEALTH SERVICES | Facility: HOME HEALTH | Age: 25
End: 2025-05-09
Payer: COMMERCIAL

## 2025-05-09 DIAGNOSIS — K50.10 CROHN'S DISEASE OF LARGE INTESTINE WITHOUT COMPLICATIONS (H): ICD-10-CM

## 2025-05-09 LAB
ALBUMIN SERPL BCG-MCNC: 3.7 G/DL (ref 3.5–5.2)
ALP SERPL-CCNC: 122 U/L (ref 40–150)
ALT SERPL W P-5'-P-CCNC: 19 U/L (ref 0–50)
AST SERPL W P-5'-P-CCNC: 22 U/L (ref 0–45)
BASOPHILS # BLD AUTO: 0 10E3/UL (ref 0–0.2)
BASOPHILS NFR BLD AUTO: 0 %
BILIRUB DIRECT SERPL-MCNC: 0.12 MG/DL (ref 0–0.3)
BILIRUB SERPL-MCNC: 0.3 MG/DL
CRP SERPL-MCNC: 17.57 MG/L
EOSINOPHIL # BLD AUTO: 0.1 10E3/UL (ref 0–0.7)
EOSINOPHIL NFR BLD AUTO: 2 %
ERYTHROCYTE [DISTWIDTH] IN BLOOD BY AUTOMATED COUNT: 14.1 % (ref 10–15)
HCT VFR BLD AUTO: 39.9 % (ref 35–47)
HGB BLD-MCNC: 12.9 G/DL (ref 11.7–15.7)
IMM GRANULOCYTES # BLD: 0 10E3/UL
IMM GRANULOCYTES NFR BLD: 0 %
LYMPHOCYTES # BLD AUTO: 2.8 10E3/UL (ref 0.8–5.3)
LYMPHOCYTES NFR BLD AUTO: 32 %
MCH RBC QN AUTO: 27.1 PG (ref 26.5–33)
MCHC RBC AUTO-ENTMCNC: 32.3 G/DL (ref 31.5–36.5)
MCV RBC AUTO: 84 FL (ref 78–100)
MONOCYTES # BLD AUTO: 0.7 10E3/UL (ref 0–1.3)
MONOCYTES NFR BLD AUTO: 8 %
NEUTROPHILS # BLD AUTO: 5 10E3/UL (ref 1.6–8.3)
NEUTROPHILS NFR BLD AUTO: 58 %
NRBC # BLD AUTO: 0 10E3/UL
NRBC BLD AUTO-RTO: 0 /100
PLATELET # BLD AUTO: 413 10E3/UL (ref 150–450)
PROT SERPL-MCNC: 6.9 G/DL (ref 6.4–8.3)
RBC # BLD AUTO: 4.76 10E6/UL (ref 3.8–5.2)
WBC # BLD AUTO: 8.7 10E3/UL (ref 4–11)

## 2025-05-09 PROCEDURE — S9359 HIT ANTI-TNF PER DIEM: HCPCS

## 2025-05-09 PROCEDURE — S1015 IV TUBING EXTENSION SET: HCPCS

## 2025-05-09 PROCEDURE — 84460 ALANINE AMINO (ALT) (SGPT): CPT | Performed by: INTERNAL MEDICINE

## 2025-05-09 PROCEDURE — 85025 COMPLETE CBC W/AUTO DIFF WBC: CPT | Performed by: INTERNAL MEDICINE

## 2025-05-09 PROCEDURE — 86140 C-REACTIVE PROTEIN: CPT | Performed by: INTERNAL MEDICINE

## 2025-05-09 PROCEDURE — A4215 STERILE NEEDLE: HCPCS

## 2025-05-12 ENCOUNTER — RESULTS FOLLOW-UP (OUTPATIENT)
Dept: GASTROENTEROLOGY | Facility: CLINIC | Age: 25
End: 2025-05-12

## 2025-05-14 ENCOUNTER — TELEPHONE (OUTPATIENT)
Dept: ENDOCRINOLOGY | Facility: CLINIC | Age: 25
End: 2025-05-14
Payer: COMMERCIAL

## 2025-05-14 NOTE — TELEPHONE ENCOUNTER
PRIOR AUTHORIZATION DENIED    Medication: WEGOVY 0.25 MG/0.5ML SC SOAJ  Insurance Company: [employer] - Phone [phone] Fax [phone]  Denial Date: 5/14/2025  Denial Reason(s):   Appeal Information:   Patient Notified: clinic to discuss with pt

## 2025-05-14 NOTE — TELEPHONE ENCOUNTER
PA Initiation    Medication: WEGOVY 0.25 MG/0.5ML SC SOAJ  Insurance Company: [employer] [facility] - Phone [phone] Fax [phone]  Pharmacy Filling the Rx: [facility] #[id number] [place] [address] [place] [facility] AT [address]  Filling Pharmacy Phone: [phone]  Filling Pharmacy Fax: [phone]  Start Date: 5/14/2025

## 2025-05-14 NOTE — LETTER
"May 15, 2025    To: [facility]    RE: Bi Alves  [address] [facility] [address]  : 2000  MRN: [id number]    To Whom It May Concern,    I am writing on behalf of my patient, Bi Alves  to document the medical necessity of Wegovy for the treatment of Obesityvsoverweight: Class III Obesity (BMI at least 40 kg/m2). This letter provides information about the patient's medical history and diagnosis and a statement summarizing my treatment rationale.     Summary of Patient History and Diagnosis  Bi Alves is a 25 year old female with a diagnosis of Obesityvsoverweight: Class III Obesity (BMI at least 40 kg/m2).     Estimated body mass index is 50.02 kg/m  as calculated from the following:    Height as of 3/31/25: 1.727 m (5' 8\").    Weight as of 24: 149.2 kg (329 lb).    Wt Readings from Last 4 Encounters:   24 (!) 149.2 kg (329 lb)   25 (!) 149.7 kg (330 lb)   25 (!) 147.8 kg (325 lb 13.4 oz)   25 (!) 147.8 kg (325 lb 13.4 oz)       Treatment Rationale  Despite lifestyle/health improvements with nutrition, exercise, and behavioral changes for at least 6 months, the patient has been unable to achieve significant and sustainable weight loss. Previous attempts for weight loss include: Watching Portions or Calories, Weight Watchers, Atkins-type Diet (Low Carb/High Protein), Weight Loss Surgery.     Previous attempts with the below medications were unsuccessful due to intolerable side effects and/or are contraindicated:    Zepbound    Wegovy (semaglutide) is an FDA-approved medication for chronic weight management in adults with a BMI of 30 or higher or a BMI of 27 or higher with weight-related comorbidity. The patient's BMI qualifies them for Wegovy, which has shown remarkable efficacy and a favorable safety profile. Patient has no history of pancreatitis. The patient has no personal or family history of medullary thyroid carcinoma or MEN2.     The " patient's unsuccessful weight management attempts and previous medication failures highlight the need for Wegovy as a medically necessary treatment option. Denying coverage would hinder the patient's progress and increase the risk of obesity-related health conditions.    I kindly request that you review Bi's case and reconsider coverage for Wegovy as an integral part of their obesity treatment plan.     Duration  12 months     Summary  In summary, Wegovy is medically necessary for this patient s medical condition. Please call my office at [phone] if I can provide you with any additional information to approve my request. I look forward to receiving your timely response and approval of this request.      Sincerely,    Juan Luis Roman MD

## 2025-05-15 NOTE — TELEPHONE ENCOUNTER
Medication Appeal Initiation    Medication: WEGOVY 0.25 MG/0.5ML SC SOAJ  Appeal Start Date:  5/15/2025  Insurance Company: [employer]  Insurance Phone: [phone]  Insurance Fax: [phone]  Comments:

## 2025-05-15 NOTE — TELEPHONE ENCOUNTER
Medication Appeal Request    Please initiate an appeal for the requested medication: WEGOVY 0.25 MG/0.5ML SC SOAJ    Has a letter of medical necessity been completed in EPIC?   yes    Any additional lab values/information to include?     Would you like to include any research articles?               If yes please include the hyperlink(s) below or fax to    [phone] for Specialty/Retail               [phone] for Infusion/Clinic Administered.                Include the patients name and MRN on the fax cover sheet.

## 2025-05-16 ENCOUNTER — OFFICE VISIT (OUTPATIENT)
Dept: FAMILY MEDICINE | Facility: CLINIC | Age: 25
End: 2025-05-16
Payer: COMMERCIAL

## 2025-05-16 VITALS
HEIGHT: 69 IN | HEART RATE: 100 BPM | DIASTOLIC BLOOD PRESSURE: 82 MMHG | SYSTOLIC BLOOD PRESSURE: 132 MMHG | BODY MASS INDEX: 43.4 KG/M2 | TEMPERATURE: 97.1 F | WEIGHT: 293 LBS | RESPIRATION RATE: 20 BRPM | OXYGEN SATURATION: 98 %

## 2025-05-16 DIAGNOSIS — Z12.4 CERVICAL CANCER SCREENING: ICD-10-CM

## 2025-05-16 DIAGNOSIS — Z13.6 SCREENING FOR CARDIOVASCULAR CONDITION: ICD-10-CM

## 2025-05-16 DIAGNOSIS — Z13.1 SCREENING FOR DIABETES MELLITUS: ICD-10-CM

## 2025-05-16 DIAGNOSIS — K50.119 CROHN'S DISEASE OF PERIANAL REGION WITH COMPLICATION (H): ICD-10-CM

## 2025-05-16 DIAGNOSIS — E78.1 HYPERTRIGLYCERIDEMIA: ICD-10-CM

## 2025-05-16 DIAGNOSIS — Z00.00 ROUTINE GENERAL MEDICAL EXAMINATION AT A HEALTH CARE FACILITY: Primary | ICD-10-CM

## 2025-05-16 PROBLEM — K82.8 BILIARY DYSKINESIA: Status: ACTIVE | Noted: 2022-04-28

## 2025-05-16 PROBLEM — R53.83 FATIGUE: Status: ACTIVE | Noted: 2022-04-28

## 2025-05-16 PROBLEM — R10.13 DYSPEPSIA: Status: ACTIVE | Noted: 2022-04-28

## 2025-05-16 PROBLEM — K82.8 BILIARY DYSKINESIA: Status: RESOLVED | Noted: 2022-04-28 | Resolved: 2025-05-16

## 2025-05-16 PROBLEM — K50.10 CROHN'S DISEASE OF LARGE INTESTINE (H): Status: ACTIVE | Noted: 2024-11-06

## 2025-05-16 PROBLEM — K58.9 IBS (IRRITABLE BOWEL SYNDROME): Status: ACTIVE | Noted: 2022-04-28

## 2025-05-16 PROBLEM — K50.10 PERIANAL CROHN'S DISEASE (H): Status: ACTIVE | Noted: 2022-10-20

## 2025-05-16 PROCEDURE — 90677 PCV20 VACCINE IM: CPT | Performed by: PHYSICIAN ASSISTANT

## 2025-05-16 PROCEDURE — 90480 ADMN SARSCOV2 VAC 1/ONLY CMP: CPT | Performed by: PHYSICIAN ASSISTANT

## 2025-05-16 PROCEDURE — 90471 IMMUNIZATION ADMIN: CPT | Performed by: PHYSICIAN ASSISTANT

## 2025-05-16 PROCEDURE — 90750 HZV VACC RECOMBINANT IM: CPT | Performed by: PHYSICIAN ASSISTANT

## 2025-05-16 PROCEDURE — 90472 IMMUNIZATION ADMIN EACH ADD: CPT | Performed by: PHYSICIAN ASSISTANT

## 2025-05-16 PROCEDURE — 99385 PREV VISIT NEW AGE 18-39: CPT | Mod: 25 | Performed by: PHYSICIAN ASSISTANT

## 2025-05-16 PROCEDURE — 91320 SARSCV2 VAC 30MCG TRS-SUC IM: CPT | Performed by: PHYSICIAN ASSISTANT

## 2025-05-16 RX ORDER — QUETIAPINE FUMARATE 25 MG/1
25 TABLET, FILM COATED ORAL AT BEDTIME
COMMUNITY

## 2025-05-16 RX ORDER — CLONIDINE HYDROCHLORIDE 0.2 MG/1
2 TABLET ORAL DAILY
COMMUNITY

## 2025-05-16 RX ORDER — HYOSCYAMINE SULFATE 0.12 MG/1
TABLET SUBLINGUAL
COMMUNITY
Start: 2024-09-20

## 2025-05-16 RX ORDER — INFLIXIMAB 100 MG/10ML
10 INJECTION, POWDER, LYOPHILIZED, FOR SOLUTION INTRAVENOUS
COMMUNITY

## 2025-05-16 SDOH — HEALTH STABILITY: PHYSICAL HEALTH: ON AVERAGE, HOW MANY DAYS PER WEEK DO YOU ENGAGE IN MODERATE TO STRENUOUS EXERCISE (LIKE A BRISK WALK)?: 2 DAYS

## 2025-05-16 ASSESSMENT — SOCIAL DETERMINANTS OF HEALTH (SDOH): HOW OFTEN DO YOU GET TOGETHER WITH FRIENDS OR RELATIVES?: NEVER

## 2025-05-16 ASSESSMENT — PAIN SCALES - GENERAL: PAINLEVEL_OUTOF10: NO PAIN (0)

## 2025-05-16 ASSESSMENT — PATIENT HEALTH QUESTIONNAIRE - PHQ9
SUM OF ALL RESPONSES TO PHQ QUESTIONS 1-9: 16
10. IF YOU CHECKED OFF ANY PROBLEMS, HOW DIFFICULT HAVE THESE PROBLEMS MADE IT FOR YOU TO DO YOUR WORK, TAKE CARE OF THINGS AT HOME, OR GET ALONG WITH OTHER PEOPLE: VERY DIFFICULT
SUM OF ALL RESPONSES TO PHQ QUESTIONS 1-9: 16

## 2025-05-16 NOTE — PATIENT INSTRUCTIONS
Patient Education   Preventive Care Advice   This is general advice given by our system to help you stay healthy. However, your care team may have specific advice just for you. Please talk to your care team about your preventive care needs.  Nutrition  Eat 5 or more servings of fruits and vegetables each day.  Try wheat bread, brown rice and whole grain pasta (instead of white bread, rice, and pasta).  Get enough calcium and vitamin D. Check the label on foods and aim for 100% of the RDA (recommended daily allowance).  Lifestyle  Exercise at least 150 minutes each week  (30 minutes a day, 5 days a week).  Do muscle strengthening activities 2 days a week. These help control your weight and prevent disease.  No smoking.  Wear sunscreen to prevent skin cancer.  Have a dental exam and cleaning every 6 months.  Yearly exams  See your health care team every year to talk about:  Any changes in your health.  Any medicines your care team has prescribed.  Preventive care, family planning, and ways to prevent chronic diseases.  Shots (vaccines)   HPV shots (up to age 26), if you've never had them before.  Hepatitis B shots (up to age 59), if you've never had them before.  COVID-19 shot: Get this shot when it's due.  Flu shot: Get a flu shot every year.  Tetanus shot: Get a tetanus shot every 10 years.  Pneumococcal, hepatitis A, and RSV shots: Ask your care team if you need these based on your risk.  Shingles shot (for age 50 and up)  General health tests  Diabetes screening:  Starting at age 35, Get screened for diabetes at least every 3 years.  If you are younger than age 35, ask your care team if you should be screened for diabetes.  Cholesterol test: At age 39, start having a cholesterol test every 5 years, or more often if advised.  Bone density scan (DEXA): At age 50, ask your care team if you should have this scan for osteoporosis (brittle bones).  Hepatitis C: Get tested at least once in your life.  STIs (sexually  transmitted infections)  Before age 24: Ask your care team if you should be screened for STIs.  After age 24: Get screened for STIs if you're at risk. You are at risk for STIs (including HIV) if:  You are sexually active with more than one person.  You don't use condoms every time.  You or a partner was diagnosed with a sexually transmitted infection.  If you are at risk for HIV, ask about PrEP medicine to prevent HIV.  Get tested for HIV at least once in your life, whether you are at risk for HIV or not.  Cancer screening tests  Cervical cancer screening: If you have a cervix, begin getting regular cervical cancer screening tests starting at age 21.  Breast cancer scan (mammogram): If you've ever had breasts, begin having regular mammograms starting at age 40. This is a scan to check for breast cancer.  Colon cancer screening: It is important to start screening for colon cancer at age 45.  Have a colonoscopy test every 10 years (or more often if you're at risk) Or, ask your provider about stool tests like a FIT test every year or Cologuard test every 3 years.  To learn more about your testing options, visit:   .  For help making a decision, visit:   https://bit.ly/tg84667.  Prostate cancer screening test: If you have a prostate, ask your care team if a prostate cancer screening test (PSA) at age 55 is right for you.  Lung cancer screening: If you are a current or former smoker ages 50 to 80, ask your care team if ongoing lung cancer screenings are right for you.  For informational purposes only. Not to replace the advice of your health care provider. Copyright   2023 [facility]. All rights reserved. Clinically reviewed by the [facility]. [employer] [id number] - REV 01/24.  Relationships for Good Health  Relationships are important for our health and happiness. Social isolation, loneliness and lack of support are bad for your health. Studies show that loneliness can harm health  and limit your life span as much as high blood pressure and smoking.   Take some time to reflect on your relationships. Then answer these questions:  Are there people in your life that cause you stress or drain your energy? What can you do to set limits?  ________________________________________________________________________________________________________________________________________________________________________________________________________________________________________________________________________________________________________________________________________________  Who do you enjoy spending time with? Who can you go to for support?  ________________________________________________________________________________________________________________________________________________________________________________________________________________________________________________________________________________________________________________________________________________  What can you do to improve your relationships with others?  __________________________________________________________________________________________________________________________________________________________________________________________________________________  ______________________________________________________________________________________________________________________________  What do you like most about your relationships with others?  ________________________________________________________________________________________________________________________________________________________________________________________________________________________________________________________________________________________________________________________________________________  My goal: ______________________________________________________________________  I will:  ______________________________________________________________________________________________________________________________________________________________________________________________    For informational purposes only. Not to replace the advice of your health care provider. Copyright   2018 [facility]. All rights reserved. Clinically reviewed by [facility]  [facility]. [employer] 807751 - Rev 06/24.  Learning About Stress  What is stress?     Stress is your body's response to a hard situation. Your body can have a physical, emotional, or mental response. Stress is a fact of life for most people, and it affects everyone differently. What causes stress for you may not be stressful for someone else.  A lot of things can cause stress. You may feel stress when you go on a job interview, take a test, or run a race. This kind of short-term stress is normal and even useful. It can help you if you need to work hard or react quickly. For example, stress can help you finish an important job on time.  Long-term stress is caused by ongoing stressful situations or events. Examples of long-term stress include long-term health problems, ongoing problems at work, or conflicts in your family. Long-term stress can harm your health.  How does stress affect your health?  When you are stressed, your body responds as though you are in danger. It makes hormones that speed up your heart, make you breathe faster, and give you a burst of energy. This is called the fight-or-flight stress response. If the stress is over quickly, your body goes back to normal and no harm is done.  But if stress happens too often or lasts too long, it can have bad effects. Long-term stress can make you more likely to get sick, and it can make symptoms of some diseases worse. If you tense up when you are stressed, you may develop neck, shoulder, or low back pain. Stress is linked to high blood pressure and heart disease.  Stress also  harms your emotional health. It can make you olson, tense, or depressed. Your relationships may suffer, and you may not do well at work or school.  What can you do to manage stress?  You can try these things to help manage stress:   Do something active. Exercise or activity can help reduce stress. Walking is a great way to get started. Even everyday activities such as housecleaning or yard work can help.  Try yoga or lachelle chi. These techniques combine exercise and meditation. You may need some training at first to learn them.  Do something you enjoy. For example, listen to music or go to a movie. Practice your hobby or do volunteer work.  Meditate. This can help you relax, because you are not worrying about what happened before or what may happen in the future.  Do guided imagery. Imagine yourself in any setting that helps you feel calm. You can use online videos, books, or a teacher to guide you.  Do breathing exercises. For example:  From a standing position, bend forward from the waist with your knees slightly bent. Let your arms dangle close to the floor.  Breathe in slowly and deeply as you return to a standing position. Roll up slowly and lift your head last.  Hold your breath for just a few seconds in the standing position.  Breathe out slowly and bend forward from the waist.  Let your feelings out. Talk, laugh, cry, and express anger when you need to. Talking with supportive friends or family, a counselor, or a eleazar leader about your feelings is a healthy way to relieve stress. Avoid discussing your feelings with people who make you feel worse.  Write. It may help to write about things that are bothering you. This helps you find out how much stress you feel and what is causing it. When you know this, you can find better ways to cope.  What can you do to prevent stress?  You might try some of these things to help prevent stress:  Manage your time. This helps you find time to do the things you want and need to  "do.  Get enough sleep. Your body recovers from the stresses of the day while you are sleeping.  Get support. Your family, friends, and community can make a difference in how you experience stress.  Limit your news feed. Avoid or limit time on social media or news that may make you feel stressed.  Do something active. Exercise or activity can help reduce stress. Walking is a great way to get started.  Where can you learn more?  Go to https://www.[employer]/patiented  Enter N032 in the search box to learn more about \"Learning About Stress.\"  Current as of: October 24, 2024  Content Version: 14.4    4895-2425 [email or web address].   Care instructions adapted under license by your healthcare professional. If you have questions about a medical condition or this instruction, always ask your healthcare professional. [email or web address] disclaims any warranty or liability for your use of this information.    Learning About Depression Screening  What is depression screening?  Depression screening is a way to see if you have depression symptoms. It may be done by a doctor or counselor. It's often part of a routine checkup. That's because your mental health is just as important as your physical health.  Depression is a mental health condition that affects how you feel, think, and act. You may:  Have less energy.  Lose interest in your daily activities.  Feel sad and grouchy for a long time.  Depression is very common. It affects people of all ages.  Many things can lead to depression. Some people become depressed after they have a stroke or find out they have a major illness like cancer or heart disease. The death of a loved one or a breakup may lead to depression. It can run in families. Most experts believe that a combination of inherited genes and stressful life events can cause it.  What happens during screening?  You may be asked to fill out a form about your depression symptoms. You and the doctor will discuss " "your answers. The doctor may ask you more questions to learn more about how you think, act, and feel.  What happens after screening?  If you have symptoms of depression, your doctor will talk to you about your options.  Doctors usually treat depression with medicines or counseling. Often, combining the two works best. Many people don't get help because they think that they'll get over the depression on their own. But people with depression may not get better unless they get treatment.  The cause of depression is not well understood. There may be many factors involved. But if you have depression, it's not your fault.  A serious symptom of depression is thinking about death or suicide. If you or someone you care about talks about this or about feeling hopeless, get help right away.  It's important to know that depression can be treated. Medicine, counseling, and self-care may help.  Where can you learn more?  Go to https://www.[employer].net/patiented  Enter T185 in the search box to learn more about \"Learning About Depression Screening.\"  Current as of: July 31, 2024  Content Version: 14.4    8848-7503 [employer].   Care instructions adapted under license by your healthcare professional. If you have questions about a medical condition or this instruction, always ask your healthcare professional. [employer] disclaims any warranty or liability for your use of this information.       "

## 2025-05-16 NOTE — PROGRESS NOTES
Preventive Care Visit  [facility]  ROXANA FLOR PA-C, Physician Assistant - Medical  May 16, 2025      Assessment & Plan     Routine general medical examination at a health care facility  Preventative    Recommend twice yearly dental exams  Recommend every 2 year eye exams, annually if wearing corrective lenses  Recommend Calcium 1000mg daily either obtained in a 500mg twice daily supplement or through diet (or a combination of both).   Recommend 30-60 minutes cardiovascular exercise every day outside of usual activity and sveral days/week strength exercises.  Drink 40-60 ounces water daily  Try to get 4-5 servings fruits/vegetable daily  Eat quality lean proteins and high fiber whole foods  Try to stay away from processed or packaged foods    Physical exam performed and preventative counseling provided. Given weight management is providing prescription for Zepbound, recommend she follow up with them for prior auth difficulties.     F/U annually for wellness exam and in the interim as needed for problem visits.  --Fasting labs ordered. BMP, CBC, Lipase and Lipids.     Screening for cardiovascular condition  Preventative    Hypertriglyceridemia  Chronic    Cervical cancer screening  Preventative  Declines today. She is getting this done with gynecology.    Screening for diabetes mellitus  Preventative    Crohn's disease of perianal region with complication (H)  Chronic  Vitamin D and B12 ordered per patient request to be followed up with GI.     Patient understands and agrees. If concerns, patient is to call clinic or seek emergent care if needed. All questions answered. Follow up in one year.     Patient seen in conjunction with NADEGE Mercado. I was present for the evaluation of patient. I verify that documentation is correct and true. JAMES Flor PA-C    Psychiatric medications managed by psychiatry. Weight loss medications managed by Weight Loss/Bariatrics.       BMI  Estimated  "body mass index is 48.49 kg/m  as calculated from the following:    Height as of this encounter: 1.746 m (5' 8.75\").    Weight as of this encounter: 147.9 kg (326 lb).   Weight management plan: Patient already seeing weight management    Depression Screening Follow Up        5/16/2025     2:47 PM   PHQ   PHQ-9 Total Score 16    Q9: Thoughts of better off dead/self-harm past 2 weeks Not at all       Patient-reported         5/16/2025     2:47 PM   Last PHQ-9   1.  Little interest or pleasure in doing things 2   2.  Feeling down, depressed, or hopeless 2   3.  Trouble falling or staying asleep, or sleeping too much 2   4.  Feeling tired or having little energy 3   5.  Poor appetite or overeating 1   6.  Feeling bad about yourself 2   7.  Trouble concentrating 2   8.  Moving slowly or restless 2   Q9: Thoughts of better off dead/self-harm past 2 weeks 0   PHQ-9 Total Score 16        Patient-reported         Follow Up Actions Taken  Crisis resource information provided in After Visit Summary  Referred patient back to current mental health provider.     Counseling  Appropriate preventive services were addressed with this patient via screening, questionnaire, or discussion as appropriate for fall prevention, nutrition, physical activity, Tobacco-use cessation, social engagement, weight loss and cognition.  Checklist reviewing preventive services available has been given to the patient.  Reviewed patient's diet, addressing concerns and/or questions.   She is at risk for lack of exercise and has been provided with information to increase physical activity for the benefit of her well-being.   Patient is at risk for social isolation and has been provided with information about the benefit of social connection.   The patient was instructed to see the dentist every 6 months.   She is at risk for psychosocial distress and has been provided with information to reduce risk.   The patient's PHQ-9 score is consistent with moderate " depression. She was provided with information regarding depression.     Maurizio Osorio is a 25 year old, presenting for the following:  Physical        2025     3:10 PM   Additional Questions   Roomed by rachael   Accompanied by self         2025     3:10 PM   Patient Reported Additional Medications   Patient reports taking the following new medications see chart        Well Women Physical Exam:     Date of last exam:      LMP: January, she gets her period every 4 months with OCP. Has debilitating periods  Fertility history:   Birth Control: OCPs  STD Screening: no concern    PAP/Cervical Cancer Screening: has appointment with gyn soon  Mammogram: n/a  Colon Cancer Screening: n/a  DXA: n/a  Immunizations: agreeable to covid, pneumo, zoster     Smoker: no  Alcohol Use: rarely     Diet: low gluten, increasing protein, low salt, low fat, FODMAP  Exercise: walking 2x weekly  Supplements: vitamin D, B, cranberry  Cholesterol Screening: pending  Diabetes Screening: pending     Depression and Anxiety Screening: stable, has appointment next week     Other Concerns: concern for zepbound coverage, sees weight management    HPI  No concerns     Advance Care Planning  Health Care Directive received at today's visit.  Forwarded to [employer].        2025   General Health   How would you rate your overall physical health? (!) POOR   Feel stress (tense, anxious, or unable to sleep) Rather much   (!) STRESS CONCERN      2025   Nutrition   Three or more servings of calcium each day? Yes   Diet: Low salt    Gluten-free/reduced   How many servings of fruit and vegetables per day? (!) 2-3   How many sweetened beverages each day? 0-1       Multiple values from one day are sorted in reverse-chronological order         2025   Exercise   Days per week of moderate/strenous exercise 2 days   (!) EXERCISE CONCERN      2025   Social Factors   Frequency of gathering with friends or relatives Never    Worry food won't last until get money to buy more No   Food not last or not have enough money for food? No   Do you have housing? (Housing is defined as stable permanent housing and does not include staying outside in a car, in a tent, in an abandoned building, in an overnight shelter, or couch-surfing.) Yes   Are you worried about losing your housing? No   Lack of transportation? No   Unable to get utilities (heat,electricity)? No   (!) SOCIAL CONNECTIONS CONCERN      5/16/2025   Dental   Dentist two times every year? (!) NO, verbal referral given        Today's PHQ-9 Score:       5/16/2025     2:47 PM   PHQ-9 SCORE   PHQ-9 Total Score MyChart 16 (Moderately severe depression)   PHQ-9 Total Score 16        Patient-reported         5/16/2025   Substance Use   Alcohol more than 3/day or more than 7/wk Not Applicable   Do you use any other substances recreationally? No     Social History     Tobacco Use    Smoking status: Never     Passive exposure: Never    Smokeless tobacco: Never   Vaping Use    Vaping status: Never Used   Substance Use Topics    Alcohol use: No    Drug use: No        Mammogram Screening - Patient under 40 years of age: Routine Mammogram Screening not recommended.         5/16/2025   STI Screening   New sexual partner(s) since last STI/HIV test? No     History of abnormal Pap smear: No - age 21-29 PAP every 3 years recommended. Follows with GYN.         5/16/2025   Contraception/Family Planning   Questions about contraception or family planning No       Reviewed and updated as needed this visit by Provider   Tobacco  Allergies  Meds  Problems  Med Hx  Surg Hx  Fam Hx            Past Medical History:   Diagnosis Date    Adjustment disorder     mixed anxiety and depression    Biliary dyskinesia 04/28/2022    - 6 month history of abdominal pain with associated cramping sensation and frequent loose stools  - HIDA scan at [facility], [place], operated by [facility] 11/2021 showing biliary dyskinesia but has not been seen by GI or  "general surgery  - Referral sent to GI for further management 4/2022  - Bariatric labs also sent given history of gastric sleeve surgery and concern for dumping syndrome 4/2022      Depressive disorder 1/1/2008    Eating disorder     GERD (gastroesophageal reflux disease)     Hypertriglyceridemia     Insomnia     Menorrhagia     Obesity     CHUCHO (obstructive sleep apnea)     PONV (postoperative nausea and vomiting)     Social anxiety disorder      Past Surgical History:   Procedure Laterality Date    GI SURGERY      EGD    LAPAROSCOPIC GASTRIC SLEEVE N/A 10/12/2016    Procedure: LAPAROSCOPIC GASTRIC SLEEVE;  Surgeon: Dejon Washington MD;  Location:  OR    TONSILLECTOMY & ADENOIDECTOMY      [facility] ORAL SURGERY PROCEDURE       Current Outpatient Medications   Medication Sig Dispense Refill    colestipol (COLESTID) 1 g tablet Take 1 g by mouth 2 times daily.      CRANBERRY EXTRACT PO Take 500 mg by mouth       cyanocobalamin (VITAMIN B-12) 1000 MCG sublingual tablet Place 1,000 mcg under the tongue.      Emergency Supply Kit, PIV, Patient use for emergency only. Contents: 3 sodium chloride 0.9% flushes, 1 IV start kit, 1 microclave ext set 14\", 1 each IV Cath 22 G/1\" and 24G/3/4\", 6 alcohol prep pads, 4 nitrile gloves (med). Call [phone] to reorder. 625600 kit 0    ferrous fumarate 65 mg, [place]. FE,-Vitamin C 125 mg (VITRON-C)  MG TABS tablet Take 1 tablet by mouth 2 times daily 120 tablet 3    HYDROXYZINE HCL PO Take 50 mg by mouth       hyoscyamine (LEVSIN/SL) 0.125 MG sublingual tablet       inFLIXimab-dyyb (INFLECTRA) 1,500 mg in sodium chloride 0.9 % 500 mL via gravity infusion Infuse 1,500 mg over 1 hours into the vein once every six weeks. When complete, flush bag with 20 mL NS to flush tubing. 145511 mL 0    lamoTRIgine (LAMICTAL) 200 MG tablet Take 1 tablet (200 mg) by mouth daily      omeprazole (PRILOSEC) 20 MG capsule   1    sodium chloride 0.9% infusion Infuse 500 mLs into the vein as needed " for other (infusion reaction). In case of mild reaction, administer via gravity at 20 mL/hr to keep vein open. In case of severe reaction, administer via gravity wide open on prime setting. [id number] mL 0    tirzepatide-Weight Management (ZEPBOUND) 2.5 MG/0.5ML prefilled pen Inject 0.5 mLs (2.5 mg) subcutaneously every 7 days. 3 mL 1    Venlafaxine HCl (EFFEXOR XR PO) Take 200 mg by mouth daily       cloNIDine (CATAPRES) 0.2 MG tablet Take 2 tablets by mouth daily.      diphenhydrAMINE (BENADRYL) 50 MG/ML injection Inject 1 mL (50 mg) over 3-5 minutes into the vein via push as needed for other (infusion reaction). For RN use only.  Draw up in a syringe and administer IV push.  Discard remainder of vial. (Patient not taking: Reported on 5/16/2025) 30557 mL 0    EPINEPHrine (ANY BX GENERIC EQUIV) 0.3 MG/0.3ML injection 2-pack Inject 0.3 mLs (0.3 mg) into the muscle as needed for anaphylaxis (infusion reaction). Administer into the mid-thigh in case of severe anaphylaxis (wheezing, throat tightening, mouth swelling, difficulty breathing). May repeat dose one time in 5-15 minutes if symptoms persist. 450124 mL 0    inFLIXimab, GENERIC EQUIV, 100 MG injection Inject 10 mg into the vein once every six weeks.      melatonin 5 MG tablet Take 10 mg by mouth nightly as needed for sleep  (Patient not taking: Reported on 5/16/2025)      methylPREDNISolone Na Suc, PF, (SOLU-MEDROL) 125 mg/2 mL injection Inject 2 mLs (125 mg) over 3-5 minutes into the vein via push as needed (infusion reaction). For RN use only.  Reconstitute vial. Draw up methylPREDNISolone in a syringe and administer.  Discard remainder of vial. (Patient not taking: Reported on 5/16/2025) [id number] mL 0    omeprazole (PRILOSEC) 20 MG DR capsule Take 1 capsule (20 mg) by mouth daily. (Patient not taking: Reported on 5/16/2025) 90 capsule 3    prazosin (MINIPRESS) 5 MG capsule Take 1 capsule (5 mg) by mouth At Bedtime (Patient not taking: Reported on 5/16/2025)       "QUEtiapine (SEROQUEL) 25 MG tablet Take 25 mg by mouth at bedtime.      semaglutide-weight management (WEGOVY) 0.25 MG/0.5ML pen Inject 0.5 mLs (0.25 mg) subcutaneously once a week. (Patient not taking: Reported on 5/16/2025) 2 mL 0    Semaglutide-Weight Management (WEGOVY) 0.5 MG/0.5ML pen Inject 0.5 mg subcutaneously once a week. (Patient not taking: Reported on 5/16/2025) 2 mL 5    sodium chloride, PF, 0.9% PF flush Inject 10 mLs into the vein as needed for other (infusion reaction). For RN use only as needed for infusion reaction (Patient not taking: Reported on 5/16/2025) 371018 mL 0    sodium chloride, PF, 0.9% PF flush Inject 10 mLs into the vein as needed for line flush. Flush IV before and after medication administration as directed and/or at least every 12 hours. (Patient not taking: Reported on 5/16/2025) 522879 mL 0    tirzepatide-Weight Management (ZEPBOUND) 5 MG/0.5ML prefilled pen Inject 0.5 mLs (5 mg) subcutaneously every 7 days. (Patient not taking: Reported on 5/16/2025) 0.5 mL 5     Allergies   Allergen Reactions    Penicillins Hives, Anxiety, Swelling and Itching     Uncertain if can tolerate Keflex.     Uncertain if can tolerate Keflex.        Uncertain if can tolerate Keflex.     Uncertain if can tolerate Keflex.     Uncertain if can tolerate Keflex.      Uncertain if can tolerate Keflex.      uncertain, if can tolerate Keflex -    Pineapple      Throat itching    Adhesive Tape Rash    Wound Dressing Adhesive Rash         Review of Systems  Constitutional, HEENT, cardiovascular, pulmonary, GI, , musculoskeletal, neuro, skin, endocrine and psych systems are negative, except as otherwise noted.     Objective    Exam  /82   Pulse 100   Temp 97.1  F (36.2  C) (Tympanic)   Resp 20   Ht 1.746 m (5' 8.75\")   Wt (!) 147.9 kg (326 lb)   LMP  (LMP Unknown)   SpO2 98%   BMI 48.49 kg/m     Estimated body mass index is 48.49 kg/m  as calculated from the following:    Height as of this " "encounter: 1.746 m (5' 8.75\").    Weight as of this encounter: 147.9 kg (326 lb).    Physical Exam  Vitals and nursing note reviewed. Exam conducted with a chaperone present.       GENERAL: alert and no distress  EYES: Eyes grossly normal to inspection, conjunctivae and sclerae normal  HENT: ear canals and TM's normal, nose and mouth without ulcers or lesions  NECK: no adenopathy, no asymmetry, masses, or scars  RESP: lungs clear to auscultation - no rales, rhonchi or wheezes  CV: regular rate and rhythm, normal S1 S2, no S3 or S4, no murmur, click or rub, no peripheral edema  ABDOMEN: Diffuse mild tenderness (typical for patient). soft, no hepatosplenomegaly, no masses and bowel sounds normal  MS: no gross musculoskeletal defects noted, no edema  NEURO: Normal strength and tone, mentation intact and speech normal  PSYCH: mentation appears normal, affect normal/bright        Signed Electronically by: ROXANA FLOR PA-C    Answers submitted by the patient for this visit:  Patient Health Questionnaire (Submitted on 5/16/2025)  If you checked off any problems, how difficult have these problems made it for you to do your work, take care of things at home, or get along with other people?: Very difficult  PHQ9 TOTAL SCORE: 16    "

## 2025-05-20 NOTE — TELEPHONE ENCOUNTER
MEDICATION APPEAL DENIED    Medication: WEGOVY 0.25 MG/0.5ML SC SOAJ  Insurance Company: [employer]  Denial Date: 5/16/2025  Denial Reason(s):   Second Level Appeal Information:   Patient Notified: clinic to discuss with pt  Central Prior Authorization Team ONLY: Second level appeals will be managed by the clinic staff and provider. Please contact the ealth Prior Authorization Team if additional information about the denial is needed.

## 2025-06-04 ENCOUNTER — TELEPHONE (OUTPATIENT)
Dept: ENDOCRINOLOGY | Facility: CLINIC | Age: 25
End: 2025-06-04
Payer: COMMERCIAL

## 2025-06-04 NOTE — TELEPHONE ENCOUNTER
Medication Question or Refill    Contacts       Contact Date/Time Type Contact Phone/Fax    06/04/2025 02:09 PM CDT Phone (Incoming) Ita ZAMORANOJuan (Other) [phone]            What medication are you calling about (include dose and sig)?: Zepbound    Preferred Pharmacy:   [id number] - [facility] [address] [facility] [address] [facility]  [address] [facility] [address]  Phone: [phone] Fax: [phone]      Controlled Substance Agreement on file:   CSA -- Patient Level:    CSA: None found at the patient level.       Who prescribed the medication?: Dr. Roman     Do you need a refill? No    When did you use the medication last? N/A    Patient offered an appointment? No    Do you have any questions or concerns?  Yes: [employer] is calling on behalf of Medicaid to confirm that Dr. Roman prescribed the medication and in what quantity before they can send any notification of the review. Questions please call [phone] option 4. Case reference number [id number].      Could we send this information to you in [employer] or would you prefer to receive a phone call?:   Patient would like to be contacted via [employer]

## 2025-06-10 ENCOUNTER — OFFICE VISIT (OUTPATIENT)
Dept: URGENT CARE | Facility: URGENT CARE | Age: 25
End: 2025-06-10
Payer: COMMERCIAL

## 2025-06-10 VITALS
BODY MASS INDEX: 43.4 KG/M2 | OXYGEN SATURATION: 96 % | HEART RATE: 104 BPM | HEIGHT: 69 IN | DIASTOLIC BLOOD PRESSURE: 80 MMHG | WEIGHT: 293 LBS | SYSTOLIC BLOOD PRESSURE: 132 MMHG | RESPIRATION RATE: 16 BRPM | TEMPERATURE: 98.9 F

## 2025-06-10 DIAGNOSIS — N61.0 MASTITIS, LEFT, ACUTE: Primary | ICD-10-CM

## 2025-06-10 PROCEDURE — 99214 OFFICE O/P EST MOD 30 MIN: CPT | Performed by: EMERGENCY MEDICINE

## 2025-06-10 RX ORDER — CEPHALEXIN 500 MG/1
500 CAPSULE ORAL 4 TIMES DAILY
Qty: 40 CAPSULE | Refills: 0 | Status: SHIPPED | OUTPATIENT
Start: 2025-06-10 | End: 2025-06-20

## 2025-06-10 NOTE — PROGRESS NOTES
"Assessment & Plan     Diagnosis:    ICD-10-CM    1. Mastitis, left, acute  N61.0 cephALEXin (KEFLEX) 500 MG capsule          Medical Decision Making:  Bi Alves is a 25 year old female who presents for evaluation of left breast tenderness, fever, myalgias. Patient is not breastfeeding.  A broad DDx was considered for her symptoms of course; most common etiologies would include mastitis, viral syndrome, breast abscess, malignancy, blocked milk duct, etc. There is associated redness on the left side.  The breast is warm to touch compared to other side.   There is no signs at this point of severe sepsis or septic shock.   Close follow-up of gynecology or primary depending on patient preferences. Ideally would like breast re-eval in the next 24-48 hours.  Patient verbalizes understanding and agreement with the plan including reasons to go to the ER. All questions answered.     Gonzalo Smith PA-C  [facility]    Subjective     Bi Alves is a 25 year old female who presents to clinic today for the following health issues:  Chief Complaint   Patient presents with    Fever     101.4 at 12:30pm     Breast Problem     Mastitis         HPI  Patient with fever starting this afternoon, body aches, fatigue, noticed redness developing on the left breast with tenderness. She notes she is on Remicade; concerned due to her immunocompromised status. No cough, sore throat, chest pain, shortness of breath, vomiting, diarrhea, neck pain/stiffness.    Review of Systems    See HPI    Objective      Vitals: /80 (BP Location: Right arm, Patient Position: Sitting, Cuff Size: Adult Large)   Pulse 104   Temp 98.9  F (37.2  C) (Tympanic)   Resp 16   Ht 1.753 m (5' 9\")   Wt (!) 149.7 kg (330 lb)   LMP  (LMP Unknown)   SpO2 96%   BMI 48.73 kg/m        Patient Vitals for the past 24 hrs:   BP Temp Temp src Pulse Resp SpO2 Height Weight   06/10/25 1903 -- -- -- 104 16 -- -- --   06/10/25 1829 132/80 " "98.9  F (37.2  C) Tympanic (!) 136 -- 96 % 1.753 m (5' 9\") (!) 149.7 kg (330 lb)       Vital signs reviewed by: Gonzalo Smith PA-C    Physical Exam   Constitutional: Patient is alert and cooperative. Mild acute distress.  Cardiovascular: Tachycardic. Regular rhythm.  Pulmonary/Chest: Lungs are clear to auscultation throughout. Effort normal. No respiratory distress. No wheezes, rales or rhonchi.  Neurological: Alert and oriented x3.   MSK/Skin: Left breast with erythema, warmth, slightly swelling and tenderness. No areola/nipple involvement or discharge.  No fluctuance or areas of pointing.  Psychiatric:The patient has a normal mood and affect.     Gonzalo Smith PA-C, Alyssa 10, 2025      "

## 2025-06-19 ENCOUNTER — HOME INFUSION (OUTPATIENT)
Dept: HOME HEALTH SERVICES | Facility: HOME HEALTH | Age: 25
End: 2025-06-19
Payer: COMMERCIAL

## 2025-06-20 ENCOUNTER — HOME INFUSION BILLING (OUTPATIENT)
Dept: HOME HEALTH SERVICES | Facility: HOME HEALTH | Age: 25
End: 2025-06-20
Payer: COMMERCIAL

## 2025-06-20 PROCEDURE — S9359 HIT ANTI-TNF PER DIEM: HCPCS

## 2025-06-20 PROCEDURE — A4215 STERILE NEEDLE: HCPCS

## 2025-06-20 PROCEDURE — S1015 IV TUBING EXTENSION SET: HCPCS

## 2025-07-09 ENCOUNTER — E-VISIT (OUTPATIENT)
Dept: URGENT CARE | Facility: CLINIC | Age: 25
End: 2025-07-09
Payer: COMMERCIAL

## 2025-07-09 DIAGNOSIS — U07.1 INFECTION DUE TO COVID-19 VIRUS VARIANT OF CONCERN: Primary | ICD-10-CM

## 2025-07-09 PROCEDURE — 99207 PR NON-BILLABLE SERV PER CHARTING: CPT | Performed by: EMERGENCY MEDICINE

## 2025-07-09 NOTE — PATIENT INSTRUCTIONS
Dear Jo,    Thank you for submitting an evisit. I am sorry you are not feeling well. Based on your responses and your symptoms, you are eligible for treatment of your COVID-19 symptoms with Paxlovid. This is a medication that you will take twice daily for 5 days. To learn more information about Paxlovid, please visit: [email or web address]       If you take Paxlovid with other medicines, it could make you sick. This means that you need to stop taking some of your normal medicines while you're taking Paxlovid. Please carefully follow these directions so that you can take Paxlovid safely. You are taking:       If you are not improving, having difficulty breathing, difficulty drinking or staying hydrated, or experiencing new or worsening symptoms, please call [phone] to speak to a triage nurse about your symptoms.     Paxlovid is no longer free from the government. There are financial assistance programs available. You can learn more at https://paxlovid.Polar/ or 1-897.857.6769, press 2 for patient options. Please look into this before you go to the pharmacy to  your medicine.    Your Paxlovid prescription was sent to the pharmacy you requested. If you have difficulty getting the medication at this pharmacy, the following [facility] carry Paxlovid. If you need to transfer your Paxlovid prescription from your selected pharmacy, please call your preferred [facility] below and they can assist you in transferring your prescription.     [facility]: [phone] (M-F 8a-6p, Sat/Sun 8a-4p)  [place]: [phone] (M-F 9a-5p)  [place]: [phone] (M-F 8a-6p, Sat 9a-12:30p)  [place]: [phone] (M-F 8:30a-6p, Sat/Sun 8:30a-12:30p)  [place]: [phone] (M-F 7a-7p)  [place]: [phone] (-Th 8a-5:30p, Fri 8a-5p)  [place]: [phone] (M-F 8a-5p)  [place]:  [phone] (M-F 830a-6p; Sat/Sun 9a-1p)  [facility]): [phone]  (M-F 8a-7p, Sat/Sun 8a-5p)  [place]: [phone] (M-F 9a-7p, Sat/Sun 9a-1p)   [place]: [phone] (M-F 8a-8:30p, Sat 9a-5p, Sun 9a-4p)  [place]: [phone] (M-F 8a-5p, Sat 9a-12p)  [place]: [phone]  (M-F 9a-5p, Sat/Sun 8a-4p)  [place]: [phone] (M-F 8a-5p)  [phone] (M-F 8am-9p, Sat/Sun 9a-5p)    Please call [phone] if you have other questions.     Narciso Khan MD      Coronavirus (COVID-19): Care Instructions  What is COVID-19?  COVID-19 is a disease caused by a type of coronavirus. This illness was first found in 2019 and has since spread worldwide (pandemic). Symptoms can range from mild, such as fever and body aches, to severe, including trouble breathing. COVID-19 can be deadly.  Coronaviruses are a large group of viruses. Some types cause the common cold. Others cause more serious illnesses like [place] respiratory syndrome (MERS) and severe acute respiratory syndrome (SARS).  Follow-up care is a key part of your treatment and safety. Be sure to make and go to all appointments, and call your doctor if you are having problems. It's also a good idea to know your test results and keep a list of the medicines you take.  How can you self-isolate when you have COVID-19?  If you have COVID-19, there are things you can do to help avoid spreading the virus to others.  Stay home, and avoid contact with other people.  Limit contact with people in your home. If possible, stay in a separate bedroom and use a separate bathroom.  Wear a high-quality mask when you are around other people.  Improve airflow. If you have to spend time indoors with others, open windows and doors. Or you can use a fan to blow air away from people and out a window.  Avoid contact with pets and other animals.  Cover your mouth and nose with a tissue when you cough or sneeze. Then throw it in the trash right away.  Wash your hands often, especially after you cough or sneeze. Use soap and water,  "and scrub for at least 20 seconds. If soap and water aren't available, use an alcohol-based hand .  Don't share personal household items. These include bedding, towels, cups and glasses, and eating utensils.  Wash laundry in the warmest water allowed for the fabric type, and dry it completely. It's okay to wash other people's laundry with yours.  Clean and disinfect your home. Use household  and disinfectant wipes or sprays.  Go to the CDC website at cdc.gov if you have questions.  When can you end self-isolation for COVID-19?  If you know or think that you have the virus, you may need to self-isolate. When you can be around other people you live with and leave home depends on whether you have symptoms.  If you tested positive but had no symptoms, wear a mask for at least 5 days.  If you have symptoms, you need to wait until your symptoms are getting better and you haven't had a fever for 24 hours while not taking medicines to lower the fever. Once you leave isolation, wear a mask for at least 5 more days when you are around other people.  If you were very sick, were in the hospital for COVID, or have a weakened immune system, talk to your doctor about how long you should isolate and wear a mask. It might be longer than 5 days.  Call your doctor or seek care if you have questions about your symptoms or when to end isolation.  Check the CDC website at cdc.gov for the most current information.  Where can you learn more?  Go to https://www.[employer].net/patiented  Enter C007 in the search box to learn more about \"Coronavirus (COVID-19): Care Instructions.\"  Current as of: May 28, 2025  Content Version: 14.5    8715-2810 [employer].   Care instructions adapted under license by your healthcare professional. If you have questions about a medical condition or this instruction, always ask your healthcare professional. [employer] disclaims any warranty or liability for your use of " this information.

## 2025-07-16 ENCOUNTER — MYC MEDICAL ADVICE (OUTPATIENT)
Dept: FAMILY MEDICINE | Facility: CLINIC | Age: 25
End: 2025-07-16
Payer: COMMERCIAL

## 2025-07-16 DIAGNOSIS — N92.6 IRREGULAR MENSTRUAL CYCLE: Primary | ICD-10-CM

## 2025-07-16 NOTE — TELEPHONE ENCOUNTER
Please let patient know I am on vacation until Monday.My note says she is on OCPs but that is also not listed in her medication list. I would recommend she contact her GYN and ask for a one month refill to get to her appt. Otherwise, I would have been happy to refill.

## 2025-07-16 NOTE — TELEPHONE ENCOUNTER
Routing to provider - Pt was seen in office 5/16/25 for a well woman exam. Are you okay with RN team advising pt to submit an e-visit for her concern (see Diassesst message)?     Thank you - Jeanne Patricia, DKN, RN

## 2025-07-17 RX ORDER — ETHYNODIOL DIACETATE AND ETHINYL ESTRADIOL 1 MG-35MCG
1 KIT ORAL DAILY
Qty: 28 TABLET | Refills: 0 | Status: SHIPPED | OUTPATIENT
Start: 2025-07-17

## 2025-07-17 NOTE — TELEPHONE ENCOUNTER
Per Dr. Turner she is willing to give one month.  We need to find out where she would like it to and then can send to her to send Prescription(s).     Dr. Berry: Please note I prompted a Prescription(s) for you but it does not exactly match how it reads below. Awaiting a response from the patient about pharmacy.  Thank you. Yelitza Skaggs R.N.

## 2025-07-30 ENCOUNTER — HOME INFUSION (OUTPATIENT)
Dept: HOME HEALTH SERVICES | Facility: HOME HEALTH | Age: 25
End: 2025-07-30
Payer: COMMERCIAL

## 2025-07-31 ENCOUNTER — HOME INFUSION BILLING (OUTPATIENT)
Dept: HOME HEALTH SERVICES | Facility: HOME HEALTH | Age: 25
End: 2025-07-31
Payer: COMMERCIAL

## 2025-07-31 PROCEDURE — A4215 STERILE NEEDLE: HCPCS

## 2025-07-31 PROCEDURE — S1015 IV TUBING EXTENSION SET: HCPCS

## 2025-07-31 PROCEDURE — A4559 COUPLING GEL OR PASTE: HCPCS

## 2025-08-01 ENCOUNTER — LAB REQUISITION (OUTPATIENT)
Dept: LAB | Facility: CLINIC | Age: 25
End: 2025-08-01
Payer: COMMERCIAL

## 2025-08-01 DIAGNOSIS — K50.10 CROHN'S DISEASE OF LARGE INTESTINE WITHOUT COMPLICATIONS (H): ICD-10-CM

## 2025-08-01 LAB
ALBUMIN SERPL BCG-MCNC: 3.5 G/DL (ref 3.5–5.2)
ALP SERPL-CCNC: 121 U/L (ref 40–150)
ALT SERPL W P-5'-P-CCNC: 15 U/L (ref 0–50)
AST SERPL W P-5'-P-CCNC: 17 U/L (ref 0–45)
BASOPHILS # BLD AUTO: 0 10E3/UL (ref 0–0.2)
BASOPHILS NFR BLD AUTO: 0 %
BILIRUB DIRECT SERPL-MCNC: 0.1 MG/DL (ref 0–0.3)
BILIRUB SERPL-MCNC: 0.3 MG/DL
CRP SERPL-MCNC: 32.16 MG/L
EOSINOPHIL # BLD AUTO: 0.1 10E3/UL (ref 0–0.7)
EOSINOPHIL NFR BLD AUTO: 1 %
ERYTHROCYTE [DISTWIDTH] IN BLOOD BY AUTOMATED COUNT: 14.1 % (ref 10–15)
HCT VFR BLD AUTO: 36.7 % (ref 35–47)
HGB BLD-MCNC: 12.1 G/DL (ref 11.7–15.7)
IMM GRANULOCYTES # BLD: 0 10E3/UL
IMM GRANULOCYTES NFR BLD: 0 %
LYMPHOCYTES # BLD AUTO: 2.4 10E3/UL (ref 0.8–5.3)
LYMPHOCYTES NFR BLD AUTO: 29 %
MCH RBC QN AUTO: 27.7 PG (ref 26.5–33)
MCHC RBC AUTO-ENTMCNC: 33 G/DL (ref 31.5–36.5)
MCV RBC AUTO: 84 FL (ref 78–100)
MONOCYTES # BLD AUTO: 0.6 10E3/UL (ref 0–1.3)
MONOCYTES NFR BLD AUTO: 7 %
NEUTROPHILS # BLD AUTO: 5.2 10E3/UL (ref 1.6–8.3)
NEUTROPHILS NFR BLD AUTO: 62 %
NRBC # BLD AUTO: 0 10E3/UL
NRBC BLD AUTO-RTO: 0 /100
PLATELET # BLD AUTO: 400 10E3/UL (ref 150–450)
PROT SERPL-MCNC: 6.7 G/DL (ref 6.4–8.3)
RBC # BLD AUTO: 4.37 10E6/UL (ref 3.8–5.2)
WBC # BLD AUTO: 8.3 10E3/UL (ref 4–11)

## 2025-08-01 PROCEDURE — 86140 C-REACTIVE PROTEIN: CPT | Performed by: INTERNAL MEDICINE

## 2025-08-01 PROCEDURE — 85004 AUTOMATED DIFF WBC COUNT: CPT | Performed by: INTERNAL MEDICINE

## 2025-08-01 PROCEDURE — 84155 ASSAY OF PROTEIN SERUM: CPT | Performed by: INTERNAL MEDICINE

## 2025-08-01 PROCEDURE — S9359 HIT ANTI-TNF PER DIEM: HCPCS

## 2025-08-06 ENCOUNTER — OFFICE VISIT (OUTPATIENT)
Dept: OBGYN | Facility: CLINIC | Age: 25
End: 2025-08-06
Payer: COMMERCIAL

## 2025-08-06 VITALS
HEART RATE: 102 BPM | OXYGEN SATURATION: 95 % | WEIGHT: 293 LBS | DIASTOLIC BLOOD PRESSURE: 89 MMHG | BODY MASS INDEX: 47.55 KG/M2 | SYSTOLIC BLOOD PRESSURE: 130 MMHG

## 2025-08-06 DIAGNOSIS — Z01.419 ENCOUNTER FOR CERVICAL PAP SMEAR WITH PELVIC EXAM: Primary | ICD-10-CM

## 2025-08-06 DIAGNOSIS — N92.6 IRREGULAR MENSTRUAL CYCLE: ICD-10-CM

## 2025-08-06 PROCEDURE — 99459 PELVIC EXAMINATION: CPT | Performed by: NURSE PRACTITIONER

## 2025-08-06 PROCEDURE — 99203 OFFICE O/P NEW LOW 30 MIN: CPT | Performed by: NURSE PRACTITIONER

## 2025-08-06 RX ORDER — ETHYNODIOL DIACETATE AND ETHINYL ESTRADIOL 1 MG-35MCG
1 KIT ORAL DAILY
Qty: 84 TABLET | Refills: 3 | Status: SHIPPED | OUTPATIENT
Start: 2025-08-06

## 2025-08-11 PROBLEM — R87.610 ATYPICAL SQUAMOUS CELLS OF UNDETERMINED SIGNIFICANCE (ASCUS) ON PAPANICOLAOU SMEAR OF CERVIX: Status: ACTIVE | Noted: 2021-04-05

## 2025-08-11 LAB
BKR LAB AP GYN ADEQUACY: NORMAL
BKR LAB AP GYN INTERPRETATION: NORMAL
BKR LAB AP HPV REFLEX: NORMAL
BKR LAB AP PREVIOUS ABNORMAL: NORMAL
PATH REPORT.COMMENTS IMP SPEC: NORMAL
PATH REPORT.COMMENTS IMP SPEC: NORMAL
PATH REPORT.RELEVANT HX SPEC: NORMAL

## 2025-08-15 ENCOUNTER — MYC MEDICAL ADVICE (OUTPATIENT)
Dept: ENDOCRINOLOGY | Facility: CLINIC | Age: 25
End: 2025-08-15
Payer: COMMERCIAL

## 2025-08-15 DIAGNOSIS — E66.813 OBESITY, CLASS III, BMI 40-49.9 (MORBID OBESITY) (H): Primary | ICD-10-CM
